# Patient Record
Sex: FEMALE | Race: WHITE | NOT HISPANIC OR LATINO | Employment: OTHER | URBAN - NONMETROPOLITAN AREA
[De-identification: names, ages, dates, MRNs, and addresses within clinical notes are randomized per-mention and may not be internally consistent; named-entity substitution may affect disease eponyms.]

---

## 2017-01-01 ENCOUNTER — OFFICE VISIT (OUTPATIENT)
Dept: FAMILY MEDICINE CLINIC | Facility: CLINIC | Age: 77
End: 2017-01-01

## 2017-01-01 ENCOUNTER — APPOINTMENT (OUTPATIENT)
Dept: LAB | Facility: HOSPITAL | Age: 77
End: 2017-01-01

## 2017-01-01 ENCOUNTER — OFFICE VISIT (OUTPATIENT)
Dept: CARDIOLOGY | Facility: CLINIC | Age: 77
End: 2017-01-01

## 2017-01-01 ENCOUNTER — APPOINTMENT (OUTPATIENT)
Dept: CT IMAGING | Facility: HOSPITAL | Age: 77
End: 2017-01-01

## 2017-01-01 ENCOUNTER — HOSPITAL ENCOUNTER (OUTPATIENT)
Dept: NUCLEAR MEDICINE | Facility: HOSPITAL | Age: 77
Discharge: HOME | End: 2017-04-06
Attending: INTERNAL MEDICINE

## 2017-01-01 ENCOUNTER — APPOINTMENT (OUTPATIENT)
Dept: GENERAL RADIOLOGY | Facility: HOSPITAL | Age: 77
End: 2017-01-01

## 2017-01-01 ENCOUNTER — APPOINTMENT (OUTPATIENT)
Dept: CARDIOLOGY | Facility: HOSPITAL | Age: 77
End: 2017-01-01

## 2017-01-01 ENCOUNTER — OFFICE VISIT (OUTPATIENT)
Dept: PULMONOLOGY | Facility: CLINIC | Age: 77
End: 2017-01-01

## 2017-01-01 ENCOUNTER — OFFICE VISIT (OUTPATIENT)
Dept: ENDOCRINOLOGY | Facility: CLINIC | Age: 77
End: 2017-01-01

## 2017-01-01 ENCOUNTER — APPOINTMENT (OUTPATIENT)
Dept: ULTRASOUND IMAGING | Facility: HOSPITAL | Age: 77
End: 2017-01-01

## 2017-01-01 ENCOUNTER — HOSPITAL ENCOUNTER (OUTPATIENT)
Dept: NUCLEAR MEDICINE | Facility: HOSPITAL | Age: 77
Discharge: HOME | End: 2017-04-14
Attending: INTERNAL MEDICINE

## 2017-01-01 ENCOUNTER — LAB (OUTPATIENT)
Dept: LAB | Facility: HOSPITAL | Age: 77
End: 2017-01-01

## 2017-01-01 ENCOUNTER — HOSPITAL ENCOUNTER (INPATIENT)
Facility: HOSPITAL | Age: 77
LOS: 4 days | End: 2017-04-25
Attending: FAMILY MEDICINE | Admitting: FAMILY MEDICINE

## 2017-01-01 ENCOUNTER — HOSPITAL ENCOUNTER (OUTPATIENT)
Dept: CARDIOLOGY | Facility: HOSPITAL | Age: 77
Discharge: HOME | End: 2017-04-06
Attending: INTERNAL MEDICINE

## 2017-01-01 VITALS
OXYGEN SATURATION: 92 % | HEIGHT: 60 IN | HEART RATE: 84 BPM | SYSTOLIC BLOOD PRESSURE: 98 MMHG | BODY MASS INDEX: 23.98 KG/M2 | WEIGHT: 122.13 LBS | DIASTOLIC BLOOD PRESSURE: 62 MMHG

## 2017-01-01 VITALS
RESPIRATION RATE: 14 BRPM | TEMPERATURE: 96.6 F | DIASTOLIC BLOOD PRESSURE: 55 MMHG | WEIGHT: 117 LBS | SYSTOLIC BLOOD PRESSURE: 79 MMHG | HEIGHT: 60 IN | BODY MASS INDEX: 22.97 KG/M2 | OXYGEN SATURATION: 64 %

## 2017-01-01 VITALS
BODY MASS INDEX: 23.02 KG/M2 | HEART RATE: 60 BPM | WEIGHT: 117.25 LBS | RESPIRATION RATE: 18 BRPM | HEIGHT: 60 IN | DIASTOLIC BLOOD PRESSURE: 40 MMHG | OXYGEN SATURATION: 99 % | SYSTOLIC BLOOD PRESSURE: 78 MMHG

## 2017-01-01 VITALS
SYSTOLIC BLOOD PRESSURE: 100 MMHG | HEART RATE: 69 BPM | HEIGHT: 60 IN | WEIGHT: 122.31 LBS | OXYGEN SATURATION: 95 % | BODY MASS INDEX: 24.01 KG/M2 | DIASTOLIC BLOOD PRESSURE: 64 MMHG

## 2017-01-01 VITALS
HEART RATE: 75 BPM | WEIGHT: 120.13 LBS | DIASTOLIC BLOOD PRESSURE: 62 MMHG | OXYGEN SATURATION: 96 % | HEIGHT: 60 IN | SYSTOLIC BLOOD PRESSURE: 100 MMHG | BODY MASS INDEX: 23.58 KG/M2

## 2017-01-01 VITALS
SYSTOLIC BLOOD PRESSURE: 102 MMHG | HEIGHT: 60 IN | WEIGHT: 124 LBS | BODY MASS INDEX: 24.35 KG/M2 | HEART RATE: 85 BPM | OXYGEN SATURATION: 93 % | DIASTOLIC BLOOD PRESSURE: 63 MMHG

## 2017-01-01 VITALS
TEMPERATURE: 97 F | BODY MASS INDEX: 23.87 KG/M2 | WEIGHT: 121.6 LBS | SYSTOLIC BLOOD PRESSURE: 100 MMHG | DIASTOLIC BLOOD PRESSURE: 60 MMHG | HEIGHT: 60 IN | HEART RATE: 96 BPM | OXYGEN SATURATION: 96 %

## 2017-01-01 VITALS
SYSTOLIC BLOOD PRESSURE: 96 MMHG | DIASTOLIC BLOOD PRESSURE: 58 MMHG | HEIGHT: 60 IN | WEIGHT: 130.9 LBS | BODY MASS INDEX: 25.7 KG/M2 | HEART RATE: 93 BPM

## 2017-01-01 VITALS
WEIGHT: 114.56 LBS | SYSTOLIC BLOOD PRESSURE: 100 MMHG | DIASTOLIC BLOOD PRESSURE: 64 MMHG | OXYGEN SATURATION: 95 % | BODY MASS INDEX: 22.49 KG/M2 | HEART RATE: 75 BPM | HEIGHT: 60 IN

## 2017-01-01 VITALS
WEIGHT: 116.31 LBS | DIASTOLIC BLOOD PRESSURE: 66 MMHG | HEIGHT: 60 IN | OXYGEN SATURATION: 95 % | BODY MASS INDEX: 22.84 KG/M2 | SYSTOLIC BLOOD PRESSURE: 98 MMHG | HEART RATE: 102 BPM

## 2017-01-01 VITALS
OXYGEN SATURATION: 98 % | HEIGHT: 60 IN | BODY MASS INDEX: 21.99 KG/M2 | SYSTOLIC BLOOD PRESSURE: 98 MMHG | WEIGHT: 112 LBS | DIASTOLIC BLOOD PRESSURE: 58 MMHG | HEART RATE: 85 BPM

## 2017-01-01 VITALS
OXYGEN SATURATION: 96 % | DIASTOLIC BLOOD PRESSURE: 64 MMHG | HEIGHT: 60 IN | HEART RATE: 88 BPM | WEIGHT: 128.6 LBS | BODY MASS INDEX: 25.25 KG/M2 | SYSTOLIC BLOOD PRESSURE: 88 MMHG

## 2017-01-01 VITALS
BODY MASS INDEX: 24.65 KG/M2 | OXYGEN SATURATION: 94 % | HEIGHT: 60 IN | HEART RATE: 88 BPM | SYSTOLIC BLOOD PRESSURE: 100 MMHG | WEIGHT: 125.56 LBS | DIASTOLIC BLOOD PRESSURE: 62 MMHG

## 2017-01-01 VITALS
TEMPERATURE: 98 F | HEIGHT: 60 IN | DIASTOLIC BLOOD PRESSURE: 60 MMHG | SYSTOLIC BLOOD PRESSURE: 100 MMHG | HEART RATE: 90 BPM | OXYGEN SATURATION: 94 % | WEIGHT: 126.9 LBS | BODY MASS INDEX: 24.91 KG/M2

## 2017-01-01 VITALS
BODY MASS INDEX: 22.24 KG/M2 | SYSTOLIC BLOOD PRESSURE: 102 MMHG | DIASTOLIC BLOOD PRESSURE: 62 MMHG | HEART RATE: 70 BPM | WEIGHT: 113.9 LBS

## 2017-01-01 VITALS
OXYGEN SATURATION: 92 % | SYSTOLIC BLOOD PRESSURE: 100 MMHG | WEIGHT: 125.8 LBS | BODY MASS INDEX: 24.7 KG/M2 | HEIGHT: 60 IN | HEART RATE: 93 BPM | DIASTOLIC BLOOD PRESSURE: 60 MMHG

## 2017-01-01 DIAGNOSIS — J18.9 PNEUMONIA OF BOTH LUNGS DUE TO INFECTIOUS ORGANISM, UNSPECIFIED PART OF LUNG: Primary | ICD-10-CM

## 2017-01-01 DIAGNOSIS — N17.9 ACUTE RENAL FAILURE, UNSPECIFIED ACUTE RENAL FAILURE TYPE (HCC): ICD-10-CM

## 2017-01-01 DIAGNOSIS — R60.0 LOCALIZED EDEMA: ICD-10-CM

## 2017-01-01 DIAGNOSIS — J90 PLEURAL EFFUSION, BILATERAL: ICD-10-CM

## 2017-01-01 DIAGNOSIS — Z79.899 DRUG THERAPY: ICD-10-CM

## 2017-01-01 DIAGNOSIS — I50.9 HEART FAILURE, UNSPECIFIED HEART FAILURE CHRONICITY, UNSPECIFIED HEART FAILURE TYPE: ICD-10-CM

## 2017-01-01 DIAGNOSIS — R60.9 DEPENDENT EDEMA: ICD-10-CM

## 2017-01-01 DIAGNOSIS — I35.1 NONRHEUMATIC AORTIC VALVE INSUFFICIENCY: ICD-10-CM

## 2017-01-01 DIAGNOSIS — R94.31 ABNORMAL ELECTROCARDIOGRAM: ICD-10-CM

## 2017-01-01 DIAGNOSIS — R53.1 WEAKNESS GENERALIZED: ICD-10-CM

## 2017-01-01 DIAGNOSIS — R00.1 BRADYCARDIA: ICD-10-CM

## 2017-01-01 DIAGNOSIS — M81.0 OSTEOPOROSIS: ICD-10-CM

## 2017-01-01 DIAGNOSIS — I95.0 IDIOPATHIC HYPOTENSION: ICD-10-CM

## 2017-01-01 DIAGNOSIS — I50.21 ACUTE SYSTOLIC HEART FAILURE (HCC): Primary | ICD-10-CM

## 2017-01-01 DIAGNOSIS — I50.9 ACUTE ON CHRONIC CONGESTIVE HEART FAILURE, UNSPECIFIED CONGESTIVE HEART FAILURE TYPE: Primary | ICD-10-CM

## 2017-01-01 DIAGNOSIS — J18.9 PNEUMONIA DUE TO INFECTIOUS ORGANISM, UNSPECIFIED LATERALITY, UNSPECIFIED PART OF LUNG: Primary | ICD-10-CM

## 2017-01-01 DIAGNOSIS — I50.21 ACUTE SYSTOLIC HEART FAILURE (HCC): ICD-10-CM

## 2017-01-01 DIAGNOSIS — R73.03 PREDIABETES: ICD-10-CM

## 2017-01-01 DIAGNOSIS — Z23 NEED FOR VACCINATION: ICD-10-CM

## 2017-01-01 DIAGNOSIS — J90 PLEURAL EFFUSION: ICD-10-CM

## 2017-01-01 DIAGNOSIS — I10 BENIGN HYPERTENSION: ICD-10-CM

## 2017-01-01 DIAGNOSIS — R06.09 DYSPNEA ON EXERTION: Primary | ICD-10-CM

## 2017-01-01 DIAGNOSIS — I27.20 PULMONARY HYPERTENSION (HCC): ICD-10-CM

## 2017-01-01 DIAGNOSIS — I50.22 CHRONIC SYSTOLIC HEART FAILURE (HCC): Primary | ICD-10-CM

## 2017-01-01 DIAGNOSIS — I50.9 HEART FAILURE, UNSPECIFIED HEART FAILURE CHRONICITY, UNSPECIFIED HEART FAILURE TYPE: Primary | ICD-10-CM

## 2017-01-01 DIAGNOSIS — R05.9 COUGH: Primary | ICD-10-CM

## 2017-01-01 DIAGNOSIS — I95.9 HYPOTENSION, UNSPECIFIED HYPOTENSION TYPE: ICD-10-CM

## 2017-01-01 DIAGNOSIS — N17.9 AKI (ACUTE KIDNEY INJURY) (HCC): ICD-10-CM

## 2017-01-01 DIAGNOSIS — I34.0 NON-RHEUMATIC MITRAL REGURGITATION: ICD-10-CM

## 2017-01-01 DIAGNOSIS — R06.09 DYSPNEA ON EXERTION: ICD-10-CM

## 2017-01-01 DIAGNOSIS — E87.70 HYPERVOLEMIA, UNSPECIFIED HYPERVOLEMIA TYPE: ICD-10-CM

## 2017-01-01 DIAGNOSIS — I73.9 CLAUDICATION (HCC): ICD-10-CM

## 2017-01-01 DIAGNOSIS — R11.0 NAUSEA: ICD-10-CM

## 2017-01-01 DIAGNOSIS — I51.7 CARDIOMEGALY: ICD-10-CM

## 2017-01-01 DIAGNOSIS — E89.0 POSTOPERATIVE HYPOTHYROIDISM: ICD-10-CM

## 2017-01-01 DIAGNOSIS — I47.20 VENTRICULAR TACHYCARDIA (HCC): ICD-10-CM

## 2017-01-01 DIAGNOSIS — E05.90 HYPERTHYROIDISM: ICD-10-CM

## 2017-01-01 DIAGNOSIS — J18.9 PLEURAL EFFUSION ASSOCIATED WITH PULMONARY INFECTION: ICD-10-CM

## 2017-01-01 DIAGNOSIS — E89.0 POSTOPERATIVE HYPOTHYROIDISM: Primary | ICD-10-CM

## 2017-01-01 DIAGNOSIS — R11.2 NON-INTRACTABLE VOMITING WITH NAUSEA, UNSPECIFIED VOMITING TYPE: Primary | ICD-10-CM

## 2017-01-01 DIAGNOSIS — I42.8 NONISCHEMIC CARDIOMYOPATHY (HCC): Primary | ICD-10-CM

## 2017-01-01 DIAGNOSIS — J91.8 PLEURAL EFFUSION ASSOCIATED WITH PULMONARY INFECTION: ICD-10-CM

## 2017-01-01 DIAGNOSIS — I47.21 TORSADES DE POINTES (HCC): Primary | ICD-10-CM

## 2017-01-01 LAB
25(OH)D3 SERPL-MCNC: 68.4 NG/ML (ref 30–100)
A PHAGOCYTOPH DNA BLD QL NAA+PROBE: NEGATIVE
ALBUMIN SERPL-MCNC: 2.1 G/DL (ref 3.4–4.8)
ALBUMIN SERPL-MCNC: 2.8 G/DL (ref 3.4–4.8)
ALBUMIN SERPL-MCNC: 2.8 G/DL (ref 3.4–4.8)
ALBUMIN SERPL-MCNC: 3 G/DL (ref 2.9–4.4)
ALBUMIN SERPL-MCNC: 3 G/DL (ref 3.4–4.8)
ALBUMIN SERPL-MCNC: 3.1 G/DL (ref 3.4–4.8)
ALBUMIN SERPL-MCNC: 3.2 G/DL (ref 3.4–4.8)
ALBUMIN SERPL-MCNC: 3.4 G/DL (ref 3.4–4.8)
ALBUMIN SERPL-MCNC: 3.5 G/DL (ref 3.4–4.8)
ALBUMIN UR-MCNC: 56.9 MG/L
ALBUMIN/GLOB SERPL: 0.5 G/DL (ref 1.1–1.8)
ALBUMIN/GLOB SERPL: 0.6 G/DL (ref 1.1–1.8)
ALBUMIN/GLOB SERPL: 0.6 G/DL (ref 1.1–1.8)
ALBUMIN/GLOB SERPL: 0.7 {RATIO} (ref 0.7–1.7)
ALP SERPL-CCNC: 111 U/L (ref 38–126)
ALP SERPL-CCNC: 111 U/L (ref 38–126)
ALP SERPL-CCNC: 122 U/L (ref 38–126)
ALP SERPL-CCNC: 127 U/L (ref 38–126)
ALP SERPL-CCNC: 138 U/L (ref 38–126)
ALP SERPL-CCNC: 139 U/L (ref 38–126)
ALP SERPL-CCNC: 79 U/L (ref 38–126)
ALP SERPL-CCNC: 98 U/L (ref 38–126)
ALPHA1 GLOB FLD ELPH-MCNC: 0.3 G/DL (ref 0–0.4)
ALPHA2 GLOB SERPL ELPH-MCNC: 0.6 G/DL (ref 0.4–1)
ALT SERPL W P-5'-P-CCNC: 145 U/L (ref 9–52)
ALT SERPL W P-5'-P-CCNC: 19 U/L (ref 9–52)
ALT SERPL W P-5'-P-CCNC: 195 U/L (ref 9–52)
ALT SERPL W P-5'-P-CCNC: 235 U/L (ref 9–52)
ALT SERPL W P-5'-P-CCNC: 257 U/L (ref 9–52)
ALT SERPL W P-5'-P-CCNC: 268 U/L (ref 9–52)
ALT SERPL W P-5'-P-CCNC: 31 U/L (ref 9–52)
ALT SERPL W P-5'-P-CCNC: 396 U/L (ref 9–52)
AMYLASE SERPL-CCNC: 108 U/L (ref 50–130)
ANA SER QL: NEGATIVE
ANION GAP SERPL CALCULATED.3IONS-SCNC: 10 MMOL/L (ref 5–15)
ANION GAP SERPL CALCULATED.3IONS-SCNC: 11 MMOL/L (ref 5–15)
ANION GAP SERPL CALCULATED.3IONS-SCNC: 12 MMOL/L (ref 5–15)
ANION GAP SERPL CALCULATED.3IONS-SCNC: 12 MMOL/L (ref 5–15)
ANION GAP SERPL CALCULATED.3IONS-SCNC: 13 MMOL/L (ref 5–15)
ANION GAP SERPL CALCULATED.3IONS-SCNC: 14 MMOL/L (ref 5–15)
ANION GAP SERPL CALCULATED.3IONS-SCNC: 14 MMOL/L (ref 5–15)
ANION GAP SERPL CALCULATED.3IONS-SCNC: 15 MMOL/L (ref 5–15)
ANION GAP SERPL CALCULATED.3IONS-SCNC: 16 MMOL/L (ref 5–15)
ANION GAP SERPL CALCULATED.3IONS-SCNC: 17 MMOL/L (ref 5–15)
ANION GAP SERPL CALCULATED.3IONS-SCNC: 17 MMOL/L (ref 5–15)
ANISOCYTOSIS BLD QL: ABNORMAL
APTT PPP: 33.1 SECONDS (ref 20–40.3)
ARTERIAL PATENCY WRIST A: ABNORMAL
ARTERIAL PATENCY WRIST A: ABNORMAL
AST SERPL-CCNC: 138 U/L (ref 14–36)
AST SERPL-CCNC: 175 U/L (ref 14–36)
AST SERPL-CCNC: 210 U/L (ref 14–36)
AST SERPL-CCNC: 233 U/L (ref 14–36)
AST SERPL-CCNC: 242 U/L (ref 14–36)
AST SERPL-CCNC: 276 U/L (ref 14–36)
AST SERPL-CCNC: 28 U/L (ref 14–36)
AST SERPL-CCNC: 38 U/L (ref 14–36)
ATMOSPHERIC PRESS: ABNORMAL MMHG
ATMOSPHERIC PRESS: ABNORMAL MMHG
B BURGDOR DNA SPEC QL NAA+PROBE: NEGATIVE
B-GLOBULIN SERPL ELPH-MCNC: 0.6 G/DL (ref 0.7–1.3)
BACTERIA SPEC AEROBE CULT: NORMAL
BACTERIA SPEC AEROBE CULT: NORMAL
BACTERIA UR QL AUTO: ABNORMAL /HPF
BASE EXCESS BLDA CALC-SCNC: -3.7 MMOL/L (ref -2.4–2.4)
BASE EXCESS BLDA CALC-SCNC: -8.5 MMOL/L (ref -2.4–2.4)
BASOPHILS # BLD AUTO: 0.01 10*3/MM3 (ref 0–0.2)
BASOPHILS # BLD AUTO: 0.03 10*3/MM3 (ref 0–0.2)
BASOPHILS NFR BLD AUTO: 0.1 % (ref 0–2)
BASOPHILS NFR BLD AUTO: 0.2 % (ref 0–2)
BASOPHILS NFR BLD AUTO: 0.4 % (ref 0–2)
BDY SITE: ABNORMAL
BDY SITE: ABNORMAL
BH CV ECHO MEAS - BSA(HAYCOCK): 1.5 M^2
BH CV ECHO MEAS - BSA: 1.5 M^2
BH CV ECHO MEAS - BZI_BMI: 22.9 KILOGRAMS/M^2
BH CV ECHO MEAS - BZI_METRIC_HEIGHT: 152.4 CM
BH CV ECHO MEAS - BZI_METRIC_WEIGHT: 53.1 KG
BH CV ECHO MEAS - CONTRAST EF (2CH): 62.1 ML/M^2
BH CV ECHO MEAS - CONTRAST EF 4CH: 69.9 ML/M^2
BH CV ECHO MEAS - EDV(CUBED): 41.9 ML
BH CV ECHO MEAS - EDV(MOD-SP2): 79.1 ML
BH CV ECHO MEAS - EDV(MOD-SP4): 115 ML
BH CV ECHO MEAS - EDV(TEICH): 49.9 ML
BH CV ECHO MEAS - EF(CUBED): 61.1 %
BH CV ECHO MEAS - EF(MOD-SP2): 62.1 %
BH CV ECHO MEAS - EF(MOD-SP4): 69.9 %
BH CV ECHO MEAS - EF(TEICH): 53.7 %
BH CV ECHO MEAS - ESV(CUBED): 16.3 ML
BH CV ECHO MEAS - ESV(MOD-SP2): 30 ML
BH CV ECHO MEAS - ESV(MOD-SP4): 34.6 ML
BH CV ECHO MEAS - ESV(TEICH): 23.1 ML
BH CV ECHO MEAS - FS: 27 %
BH CV ECHO MEAS - IVS/LVPW: 1.2
BH CV ECHO MEAS - IVSD: 1.9 CM
BH CV ECHO MEAS - LA DIMENSION: 3.3 CM
BH CV ECHO MEAS - LV DIASTOLIC VOL/BSA (35-75): 77.4 ML/M^2
BH CV ECHO MEAS - LV MASS(C)D: 262 GRAMS
BH CV ECHO MEAS - LV MASS(C)DI: 176.3 GRAMS/M^2
BH CV ECHO MEAS - LV SYSTOLIC VOL/BSA (12-30): 23.3 ML/M^2
BH CV ECHO MEAS - LVIDD: 3.5 CM
BH CV ECHO MEAS - LVIDS: 2.5 CM
BH CV ECHO MEAS - LVLD AP2: 4.9 CM
BH CV ECHO MEAS - LVLD AP4: 7.3 CM
BH CV ECHO MEAS - LVLS AP2: 5 CM
BH CV ECHO MEAS - LVLS AP4: 5 CM
BH CV ECHO MEAS - LVPWD: 1.7 CM
BH CV ECHO MEAS - RVDD: 2.7 CM
BH CV ECHO MEAS - SI(CUBED): 17.2 ML/M^2
BH CV ECHO MEAS - SI(MOD-SP2): 33 ML/M^2
BH CV ECHO MEAS - SI(MOD-SP4): 54.1 ML/M^2
BH CV ECHO MEAS - SI(TEICH): 18 ML/M^2
BH CV ECHO MEAS - SV(CUBED): 25.6 ML
BH CV ECHO MEAS - SV(MOD-SP2): 49.1 ML
BH CV ECHO MEAS - SV(MOD-SP4): 80.4 ML
BH CV ECHO MEAS - SV(TEICH): 26.8 ML
BH CV LOWER ARTERIAL LEFT ABI RATIO: 1.14
BH CV LOWER ARTERIAL LEFT DORSALIS PEDIS SYS MAX: 110 MMHG
BH CV LOWER ARTERIAL LEFT POST TIBIAL SYS MAX: 112 MMHG
BH CV LOWER ARTERIAL RIGHT ABI RATIO: 1.14
BH CV LOWER ARTERIAL RIGHT DORSALIS PEDIS SYS MAX: 105 MMHG
BH CV LOWER ARTERIAL RIGHT POST TIBIAL SYS MAX: 112 MMHG
BH CV STRESS BP STAGE 1: NORMAL
BH CV STRESS COMMENTS STAGE 1: NORMAL
BH CV STRESS DOSE REGADENOSON STAGE 1: 0.4
BH CV STRESS DURATION MIN STAGE 1: 0
BH CV STRESS DURATION SEC STAGE 1: 10
BH CV STRESS HR STAGE 1: 82
BH CV STRESS PROTOCOL 1: NORMAL
BH CV STRESS RECOVERY BP: NORMAL MMHG
BH CV STRESS RECOVERY HR: 90 BPM
BH CV STRESS STAGE 1: 1
BILIRUB SERPL-MCNC: 0.6 MG/DL (ref 0.2–1.3)
BILIRUB SERPL-MCNC: 0.6 MG/DL (ref 0.2–1.3)
BILIRUB SERPL-MCNC: 1 MG/DL (ref 0.2–1.3)
BILIRUB SERPL-MCNC: 1.1 MG/DL (ref 0.2–1.3)
BILIRUB SERPL-MCNC: 1.3 MG/DL (ref 0.2–1.3)
BILIRUB SERPL-MCNC: 1.8 MG/DL (ref 0.2–1.3)
BILIRUB SERPL-MCNC: 2.1 MG/DL (ref 0.2–1.3)
BILIRUB SERPL-MCNC: 2.6 MG/DL (ref 0.2–1.3)
BILIRUB UR QL STRIP: ABNORMAL
BUN BLD-MCNC: 15 MG/DL (ref 7–21)
BUN BLD-MCNC: 17 MG/DL (ref 7–21)
BUN BLD-MCNC: 24 MG/DL (ref 7–21)
BUN BLD-MCNC: 25 MG/DL (ref 7–21)
BUN BLD-MCNC: 26 MG/DL (ref 7–21)
BUN BLD-MCNC: 28 MG/DL (ref 7–21)
BUN BLD-MCNC: 37 MG/DL (ref 7–21)
BUN BLD-MCNC: 44 MG/DL (ref 7–21)
BUN BLD-MCNC: 59 MG/DL (ref 7–21)
BUN BLD-MCNC: 60 MG/DL (ref 7–21)
BUN BLD-MCNC: 62 MG/DL (ref 7–21)
BUN BLD-MCNC: 63 MG/DL (ref 7–21)
BUN BLD-MCNC: 64 MG/DL (ref 7–21)
BUN BLD-MCNC: 65 MG/DL (ref 7–21)
BUN BLD-MCNC: 66 MG/DL (ref 7–21)
BUN/CREAT SERPL: 16.9 (ref 7–25)
BUN/CREAT SERPL: 18.7 (ref 7–25)
BUN/CREAT SERPL: 18.8 (ref 7–25)
BUN/CREAT SERPL: 19.9 (ref 7–25)
BUN/CREAT SERPL: 21.2 (ref 7–25)
BUN/CREAT SERPL: 21.3 (ref 7–25)
BUN/CREAT SERPL: 21.6 (ref 7–25)
BUN/CREAT SERPL: 21.8 (ref 7–25)
BUN/CREAT SERPL: 21.8 (ref 7–25)
BUN/CREAT SERPL: 22.3 (ref 7–25)
BUN/CREAT SERPL: 22.4 (ref 7–25)
BUN/CREAT SERPL: 22.6 (ref 7–25)
BUN/CREAT SERPL: 22.9 (ref 7–25)
BUN/CREAT SERPL: 24.3 (ref 7–25)
BUN/CREAT SERPL: 26.4 (ref 7–25)
C-ANCA TITR SER IF: NORMAL TITER
CA-I BLD-MCNC: 2.9 MG/DL (ref 4.5–4.9)
CA-I BLD-MCNC: 3.6 MG/DL (ref 4.5–4.9)
CA-I BLD-MCNC: 3.7 MG/DL (ref 4.5–4.9)
CA-I BLD-MCNC: 3.9 MG/DL (ref 4.5–4.9)
CA-I BLD-MCNC: 4 MG/DL (ref 4.5–4.9)
CA-I BLD-MCNC: 4.2 MG/DL (ref 4.5–4.9)
CA-I BLD-MCNC: 4.3 MG/DL (ref 4.5–4.9)
CALCIUM SPEC-SCNC: 10.2 MG/DL (ref 8.4–10.2)
CALCIUM SPEC-SCNC: 10.3 MG/DL (ref 8.4–10.2)
CALCIUM SPEC-SCNC: 6.3 MG/DL (ref 8.4–10.2)
CALCIUM SPEC-SCNC: 8.3 MG/DL (ref 8.4–10.2)
CALCIUM SPEC-SCNC: 8.7 MG/DL (ref 8.4–10.2)
CALCIUM SPEC-SCNC: 8.7 MG/DL (ref 8.4–10.2)
CALCIUM SPEC-SCNC: 8.9 MG/DL (ref 8.4–10.2)
CALCIUM SPEC-SCNC: 9 MG/DL (ref 8.4–10.2)
CALCIUM SPEC-SCNC: 9.4 MG/DL (ref 8.4–10.2)
CALCIUM SPEC-SCNC: 9.5 MG/DL (ref 8.4–10.2)
CALCIUM SPEC-SCNC: 9.6 MG/DL (ref 8.4–10.2)
CALCIUM SPEC-SCNC: 9.7 MG/DL (ref 8.4–10.2)
CALCIUM SPEC-SCNC: 9.7 MG/DL (ref 8.4–10.2)
CALCIUM SPEC-SCNC: 9.8 MG/DL (ref 8.4–10.2)
CALCIUM SPEC-SCNC: 9.9 MG/DL (ref 8.4–10.2)
CHLORIDE SERPL-SCNC: 100 MMOL/L (ref 95–110)
CHLORIDE SERPL-SCNC: 107 MMOL/L (ref 95–110)
CHLORIDE SERPL-SCNC: 90 MMOL/L (ref 95–110)
CHLORIDE SERPL-SCNC: 93 MMOL/L (ref 95–110)
CHLORIDE SERPL-SCNC: 96 MMOL/L (ref 95–110)
CHLORIDE SERPL-SCNC: 97 MMOL/L (ref 95–110)
CHLORIDE SERPL-SCNC: 99 MMOL/L (ref 95–110)
CK MB SERPL-CCNC: 2.74 NG/ML (ref 0–5)
CK MB SERPL-CCNC: 2.75 NG/ML (ref 0–5)
CK MB SERPL-CCNC: 3.01 NG/ML (ref 0–5)
CK SERPL-CCNC: 20 U/L (ref 30–135)
CK SERPL-CCNC: 25 U/L (ref 30–135)
CK SERPL-CCNC: 28 U/L (ref 30–135)
CLARITY UR: ABNORMAL
CO2 BLDA-SCNC: 15.3 MMOL/L (ref 23–27)
CO2 BLDA-SCNC: 20.4 MMOL/L (ref 23–27)
CO2 SERPL-SCNC: 14 MMOL/L (ref 22–31)
CO2 SERPL-SCNC: 15 MMOL/L (ref 22–31)
CO2 SERPL-SCNC: 16 MMOL/L (ref 22–31)
CO2 SERPL-SCNC: 17 MMOL/L (ref 22–31)
CO2 SERPL-SCNC: 18 MMOL/L (ref 22–31)
CO2 SERPL-SCNC: 20 MMOL/L (ref 22–31)
CO2 SERPL-SCNC: 25 MMOL/L (ref 22–31)
CO2 SERPL-SCNC: 25 MMOL/L (ref 22–31)
CO2 SERPL-SCNC: 27 MMOL/L (ref 22–31)
CO2 SERPL-SCNC: 27 MMOL/L (ref 22–31)
CO2 SERPL-SCNC: 28 MMOL/L (ref 22–31)
CO2 SERPL-SCNC: 29 MMOL/L (ref 22–31)
CO2 SERPL-SCNC: 29 MMOL/L (ref 22–31)
COLOR UR: ABNORMAL
CORTIS AM PEAK SERPL-MCNC: 36.9 MCG/DL
CREAT BLD-MCNC: 0.89 MG/DL (ref 0.5–1)
CREAT BLD-MCNC: 0.91 MG/DL (ref 0.5–1)
CREAT BLD-MCNC: 1.11 MG/DL (ref 0.5–1)
CREAT BLD-MCNC: 1.12 MG/DL (ref 0.5–1)
CREAT BLD-MCNC: 1.22 MG/DL (ref 0.5–1)
CREAT BLD-MCNC: 1.24 MG/DL (ref 0.5–1)
CREAT BLD-MCNC: 1.4 MG/DL (ref 0.5–1)
CREAT BLD-MCNC: 2.02 MG/DL (ref 0.5–1)
CREAT BLD-MCNC: 2.68 MG/DL (ref 0.5–1)
CREAT BLD-MCNC: 2.75 MG/DL (ref 0.5–1)
CREAT BLD-MCNC: 2.85 MG/DL (ref 0.5–1)
CREAT BLD-MCNC: 2.86 MG/DL (ref 0.5–1)
CREAT BLD-MCNC: 2.96 MG/DL (ref 0.5–1)
CREAT BLD-MCNC: 3.12 MG/DL (ref 0.5–1)
CREAT BLD-MCNC: 3.2 MG/DL (ref 0.5–1)
CRP SERPL-MCNC: 1.5 MG/DL (ref 0–1)
D-DIMER, QUANTITATIVE (MAD,POW, STR): 3199 NG/ML (FEU) (ref 0–470)
D-LACTATE SERPL-SCNC: 1.5 MMOL/L (ref 0.5–2)
D-LACTATE SERPL-SCNC: 2.3 MMOL/L (ref 0.5–2)
D-LACTATE SERPL-SCNC: 5.4 MMOL/L (ref 0.5–2)
DEPRECATED RDW RBC AUTO: 47.3 FL (ref 36.4–46.3)
DEPRECATED RDW RBC AUTO: 49.8 FL (ref 36.4–46.3)
DEPRECATED RDW RBC AUTO: 50 FL (ref 36.4–46.3)
DEPRECATED RDW RBC AUTO: 51.1 FL (ref 36.4–46.3)
DEPRECATED RDW RBC AUTO: 51.4 FL (ref 36.4–46.3)
DEPRECATED RDW RBC AUTO: 52 FL (ref 36.4–46.3)
DEPRECATED RDW RBC AUTO: 53.5 FL (ref 36.4–46.3)
DEPRECATED RDW RBC AUTO: 53.8 FL (ref 36.4–46.3)
DEPRECATED RDW RBC AUTO: 53.9 FL (ref 36.4–46.3)
E CHAFFEENSIS DNA BLD QL NAA+PROBE: NEGATIVE
EOSINOPHIL # BLD AUTO: 0 10*3/MM3 (ref 0–0.7)
EOSINOPHIL # BLD AUTO: 0.01 10*3/MM3 (ref 0–0.7)
EOSINOPHIL # BLD AUTO: 0.03 10*3/MM3 (ref 0–0.7)
EOSINOPHIL NFR BLD AUTO: 0 % (ref 0–7)
EOSINOPHIL NFR BLD AUTO: 0.1 % (ref 0–7)
EOSINOPHIL NFR BLD AUTO: 0.7 % (ref 0–7)
ERYTHROCYTE [DISTWIDTH] IN BLOOD BY AUTOMATED COUNT: 15.5 % (ref 11.5–14.5)
ERYTHROCYTE [DISTWIDTH] IN BLOOD BY AUTOMATED COUNT: 16 % (ref 11.5–14.5)
ERYTHROCYTE [DISTWIDTH] IN BLOOD BY AUTOMATED COUNT: 16 % (ref 11.5–14.5)
ERYTHROCYTE [DISTWIDTH] IN BLOOD BY AUTOMATED COUNT: 16.3 % (ref 11.5–14.5)
ERYTHROCYTE [DISTWIDTH] IN BLOOD BY AUTOMATED COUNT: 16.5 % (ref 11.5–14.5)
ERYTHROCYTE [DISTWIDTH] IN BLOOD BY AUTOMATED COUNT: 16.5 % (ref 11.5–14.5)
ERYTHROCYTE [DISTWIDTH] IN BLOOD BY AUTOMATED COUNT: 17.2 % (ref 11.5–14.5)
ERYTHROCYTE [DISTWIDTH] IN BLOOD BY AUTOMATED COUNT: 17.3 % (ref 11.5–14.5)
ERYTHROCYTE [DISTWIDTH] IN BLOOD BY AUTOMATED COUNT: 17.5 % (ref 11.5–14.5)
GAMMA GLOB SERPL ELPH-MCNC: 3.1 G/DL (ref 0.4–1.8)
GFR SERPL CREATININE-BSD FRML MDRD: 14 ML/MIN/1.73 (ref 60–90)
GFR SERPL CREATININE-BSD FRML MDRD: 15 ML/MIN/1.73
GFR SERPL CREATININE-BSD FRML MDRD: 15 ML/MIN/1.73 (ref 39–90)
GFR SERPL CREATININE-BSD FRML MDRD: 16 ML/MIN/1.73
GFR SERPL CREATININE-BSD FRML MDRD: 16 ML/MIN/1.73
GFR SERPL CREATININE-BSD FRML MDRD: 17 ML/MIN/1.73
GFR SERPL CREATININE-BSD FRML MDRD: 17 ML/MIN/1.73 (ref 39–90)
GFR SERPL CREATININE-BSD FRML MDRD: 24 ML/MIN/1.73 (ref 39–90)
GFR SERPL CREATININE-BSD FRML MDRD: 37 ML/MIN/1.73 (ref 60–90)
GFR SERPL CREATININE-BSD FRML MDRD: 42 ML/MIN/1.73 (ref 60–90)
GFR SERPL CREATININE-BSD FRML MDRD: 43 ML/MIN/1.73 (ref 39–90)
GFR SERPL CREATININE-BSD FRML MDRD: 47 ML/MIN/1.73
GFR SERPL CREATININE-BSD FRML MDRD: 48 ML/MIN/1.73 (ref 60–90)
GFR SERPL CREATININE-BSD FRML MDRD: 60 ML/MIN/1.73 (ref 39–90)
GFR SERPL CREATININE-BSD FRML MDRD: 62 ML/MIN/1.73 (ref 39–90)
GLOBULIN SER CALC-MCNC: 4.6 G/DL (ref 2.2–3.9)
GLOBULIN UR ELPH-MCNC: 4.3 GM/DL (ref 2.3–3.5)
GLOBULIN UR ELPH-MCNC: 5.3 GM/DL (ref 2.3–3.5)
GLOBULIN UR ELPH-MCNC: 5.8 GM/DL (ref 2.3–3.5)
GLOBULIN UR ELPH-MCNC: 5.9 GM/DL (ref 2.3–3.5)
GLOBULIN UR ELPH-MCNC: 6 GM/DL (ref 2.3–3.5)
GLOBULIN UR ELPH-MCNC: 6.2 GM/DL (ref 2.3–3.5)
GLUCOSE BLD-MCNC: 100 MG/DL (ref 60–100)
GLUCOSE BLD-MCNC: 103 MG/DL (ref 60–100)
GLUCOSE BLD-MCNC: 109 MG/DL (ref 60–100)
GLUCOSE BLD-MCNC: 109 MG/DL (ref 60–100)
GLUCOSE BLD-MCNC: 129 MG/DL (ref 60–100)
GLUCOSE BLD-MCNC: 68 MG/DL (ref 60–100)
GLUCOSE BLD-MCNC: 68 MG/DL (ref 60–100)
GLUCOSE BLD-MCNC: 77 MG/DL (ref 60–100)
GLUCOSE BLD-MCNC: 84 MG/DL (ref 60–100)
GLUCOSE BLD-MCNC: 86 MG/DL (ref 60–100)
GLUCOSE BLD-MCNC: 89 MG/DL (ref 60–100)
GLUCOSE BLD-MCNC: 95 MG/DL (ref 60–100)
GLUCOSE BLD-MCNC: 98 MG/DL (ref 60–100)
GLUCOSE BLDA-MCNC: 129 MMOL/L
GLUCOSE BLDA-MCNC: 98 MMOL/L
GLUCOSE UR STRIP-MCNC: NEGATIVE MG/DL
GRAN CASTS URNS QL MICRO: ABNORMAL /LPF
HAV IGM SERPL QL IA: NEGATIVE
HBV CORE IGM SERPL QL IA: NEGATIVE
HBV SURFACE AG SERPL QL IA: NEGATIVE
HCO3 BLDA-SCNC: 14.5 MMOL/L (ref 22–26)
HCO3 BLDA-SCNC: 19.5 MMOL/L (ref 22–26)
HCT VFR BLD AUTO: 30.6 % (ref 35–45)
HCT VFR BLD AUTO: 31.2 % (ref 35–45)
HCT VFR BLD AUTO: 31.3 % (ref 35–45)
HCT VFR BLD AUTO: 33.1 % (ref 35–45)
HCT VFR BLD AUTO: 33.7 % (ref 35–45)
HCT VFR BLD AUTO: 34.4 % (ref 35–45)
HCT VFR BLD AUTO: 34.8 % (ref 35–45)
HCT VFR BLD AUTO: 35.5 % (ref 35–45)
HCT VFR BLD AUTO: 37.3 % (ref 35–45)
HCT VFR BLD CALC: 37 % (ref 38–47)
HCT VFR BLD CALC: 40 % (ref 38–47)
HCV AB SER DONR QL: NEGATIVE
HGB BLD-MCNC: 11 G/DL (ref 12–15.5)
HGB BLD-MCNC: 11.5 G/DL (ref 12–15.5)
HGB BLD-MCNC: 11.6 G/DL (ref 12–15.5)
HGB BLD-MCNC: 12.1 G/DL (ref 12–15.5)
HGB BLD-MCNC: 12.2 G/DL (ref 12–15.5)
HGB BLD-MCNC: 12.3 G/DL (ref 12–15.5)
HGB BLD-MCNC: 12.5 G/DL (ref 12–15.5)
HGB BLD-MCNC: 12.8 G/DL (ref 12–15.5)
HGB BLD-MCNC: 13.3 G/DL (ref 12–15.5)
HGB BLDA-MCNC: 12.6 G/DL (ref 12–16)
HGB BLDA-MCNC: 13.7 G/DL (ref 12–16)
HGB UR QL STRIP.AUTO: ABNORMAL
HOLD SPECIMEN: NORMAL
HYALINE CASTS UR QL AUTO: ABNORMAL /LPF
IMM GRANULOCYTES # BLD: 0.01 10*3/MM3 (ref 0–0.02)
IMM GRANULOCYTES # BLD: 0.02 10*3/MM3 (ref 0–0.02)
IMM GRANULOCYTES # BLD: 0.03 10*3/MM3 (ref 0–0.02)
IMM GRANULOCYTES # BLD: 0.04 10*3/MM3 (ref 0–0.02)
IMM GRANULOCYTES NFR BLD: 0.2 % (ref 0–0.5)
IMM GRANULOCYTES NFR BLD: 0.3 % (ref 0–0.5)
IMM GRANULOCYTES NFR BLD: 0.4 % (ref 0–0.5)
IMM GRANULOCYTES NFR BLD: 0.5 % (ref 0–0.5)
INR PPP: 1.17 (ref 0.8–1.2)
INR PPP: 1.48 (ref 0.8–1.2)
INR PPP: 1.74 (ref 0.8–1.2)
IRON 24H UR-MRATE: 20 MCG/DL (ref 37–170)
IRON SATN MFR SERPL: 8 % (ref 15–50)
KETONES UR QL STRIP: NEGATIVE
L PNEUMO1 AG UR QL IA: NEGATIVE
LARGE PLATELETS: ABNORMAL
LEUKOCYTE ESTERASE UR QL STRIP.AUTO: ABNORMAL
LIPASE SERPL-CCNC: 117 U/L (ref 23–300)
LV EF 2D ECHO EST: 50 %
LV EF NUC BP: 55 %
LYMPHOCYTES # BLD AUTO: 1.03 10*3/MM3 (ref 0.6–4.2)
LYMPHOCYTES # BLD AUTO: 1.14 10*3/MM3 (ref 0.6–4.2)
LYMPHOCYTES # BLD AUTO: 1.18 10*3/MM3 (ref 0.6–4.2)
LYMPHOCYTES # BLD AUTO: 1.36 10*3/MM3 (ref 0.6–4.2)
LYMPHOCYTES # BLD AUTO: 1.55 10*3/MM3 (ref 0.6–4.2)
LYMPHOCYTES # BLD AUTO: 1.62 10*3/MM3 (ref 0.6–4.2)
LYMPHOCYTES # BLD MANUAL: 1.06 10*3/MM3 (ref 0.6–4.2)
LYMPHOCYTES NFR BLD AUTO: 13 % (ref 10–50)
LYMPHOCYTES NFR BLD AUTO: 13.1 % (ref 10–50)
LYMPHOCYTES NFR BLD AUTO: 17.4 % (ref 10–50)
LYMPHOCYTES NFR BLD AUTO: 17.6 % (ref 10–50)
LYMPHOCYTES NFR BLD AUTO: 20.6 % (ref 10–50)
LYMPHOCYTES NFR BLD AUTO: 35.6 % (ref 10–50)
LYMPHOCYTES NFR BLD MANUAL: 14 % (ref 10–50)
LYMPHOCYTES NFR BLD MANUAL: 17 % (ref 0–12)
Lab: ABNORMAL
M-SPIKE: 3 G/DL
MACROCYTES BLD QL SMEAR: ABNORMAL
MAGNESIUM SERPL-MCNC: 1.5 MG/DL (ref 1.6–2.3)
MAGNESIUM SERPL-MCNC: 1.6 MG/DL (ref 1.6–2.3)
MAGNESIUM SERPL-MCNC: 1.8 MG/DL (ref 1.6–2.3)
MAGNESIUM SERPL-MCNC: 2.1 MG/DL (ref 1.6–2.3)
MAGNESIUM SERPL-MCNC: 3.1 MG/DL (ref 1.6–2.3)
MAGNESIUM SERPL-MCNC: 3.2 MG/DL (ref 1.6–2.3)
MAGNESIUM SERPL-MCNC: 3.6 MG/DL (ref 1.6–2.3)
MAGNESIUM SERPL-MCNC: 3.9 MG/DL (ref 1.6–2.3)
MAGNESIUM SERPL-MCNC: 3.9 MG/DL (ref 1.6–2.3)
MAXIMAL PREDICTED HEART RATE: 144 BPM
MAXIMAL PREDICTED HEART RATE: 144 BPM
MCH RBC QN AUTO: 30.6 PG (ref 26.5–34)
MCH RBC QN AUTO: 31.1 PG (ref 26.5–34)
MCH RBC QN AUTO: 31.3 PG (ref 26.5–34)
MCH RBC QN AUTO: 31.4 PG (ref 26.5–34)
MCH RBC QN AUTO: 31.5 PG (ref 26.5–34)
MCH RBC QN AUTO: 31.6 PG (ref 26.5–34)
MCH RBC QN AUTO: 31.8 PG (ref 26.5–34)
MCH RBC QN AUTO: 32.2 PG (ref 26.5–34)
MCH RBC QN AUTO: 32.3 PG (ref 26.5–34)
MCHC RBC AUTO-ENTMCNC: 34.6 G/DL (ref 31.4–36)
MCHC RBC AUTO-ENTMCNC: 35.7 G/DL (ref 31.4–36)
MCHC RBC AUTO-ENTMCNC: 35.9 G/DL (ref 31.4–36)
MCHC RBC AUTO-ENTMCNC: 35.9 G/DL (ref 31.4–36)
MCHC RBC AUTO-ENTMCNC: 36.2 G/DL (ref 31.4–36)
MCHC RBC AUTO-ENTMCNC: 36.6 G/DL (ref 31.4–36)
MCHC RBC AUTO-ENTMCNC: 36.9 G/DL (ref 31.4–36)
MCHC RBC AUTO-ENTMCNC: 37.1 G/DL (ref 31.4–36)
MCHC RBC AUTO-ENTMCNC: 37.2 G/DL (ref 31.4–36)
MCV RBC AUTO: 85.7 FL (ref 80–98)
MCV RBC AUTO: 85.8 FL (ref 80–98)
MCV RBC AUTO: 86.6 FL (ref 80–98)
MCV RBC AUTO: 87.1 FL (ref 80–98)
MCV RBC AUTO: 87.1 FL (ref 80–98)
MCV RBC AUTO: 87.2 FL (ref 80–98)
MCV RBC AUTO: 87.4 FL (ref 80–98)
MCV RBC AUTO: 88.3 FL (ref 80–98)
MCV RBC AUTO: 88.3 FL (ref 80–98)
MODALITY: ABNORMAL
MODALITY: ABNORMAL
MONOCYTES # BLD AUTO: 0.83 10*3/MM3 (ref 0–0.9)
MONOCYTES # BLD AUTO: 1.17 10*3/MM3 (ref 0–0.9)
MONOCYTES # BLD AUTO: 1.29 10*3/MM3 (ref 0–0.9)
MONOCYTES # BLD AUTO: 1.3 10*3/MM3 (ref 0–0.9)
MONOCYTES # BLD AUTO: 1.42 10*3/MM3 (ref 0–0.9)
MONOCYTES # BLD AUTO: 1.56 10*3/MM3 (ref 0–0.9)
MONOCYTES # BLD AUTO: 1.68 10*3/MM3 (ref 0–0.9)
MONOCYTES NFR BLD AUTO: 17.3 % (ref 0–12)
MONOCYTES NFR BLD AUTO: 17.3 % (ref 0–12)
MONOCYTES NFR BLD AUTO: 17.9 % (ref 0–12)
MONOCYTES NFR BLD AUTO: 18 % (ref 0–12)
MONOCYTES NFR BLD AUTO: 18.2 % (ref 0–12)
MONOCYTES NFR BLD AUTO: 21.7 % (ref 0–12)
NEUTROPHILS # BLD AUTO: 2.05 10*3/MM3 (ref 2–8.6)
NEUTROPHILS # BLD AUTO: 4.39 10*3/MM3 (ref 2–8.6)
NEUTROPHILS # BLD AUTO: 4.64 10*3/MM3 (ref 2–8.6)
NEUTROPHILS # BLD AUTO: 4.66 10*3/MM3 (ref 2–8.6)
NEUTROPHILS # BLD AUTO: 5.15 10*3/MM3 (ref 2–8.6)
NEUTROPHILS # BLD AUTO: 5.44 10*3/MM3 (ref 2–8.6)
NEUTROPHILS # BLD AUTO: 5.94 10*3/MM3 (ref 2–8.6)
NEUTROPHILS NFR BLD AUTO: 45.1 % (ref 37–80)
NEUTROPHILS NFR BLD AUTO: 60.2 % (ref 37–80)
NEUTROPHILS NFR BLD AUTO: 61.7 % (ref 37–80)
NEUTROPHILS NFR BLD AUTO: 64.9 % (ref 37–80)
NEUTROPHILS NFR BLD AUTO: 68.3 % (ref 37–80)
NEUTROPHILS NFR BLD AUTO: 68.3 % (ref 37–80)
NEUTROPHILS NFR BLD MANUAL: 68 % (ref 37–80)
NITRITE UR QL STRIP: NEGATIVE
NRBC BLD MANUAL-RTO: 0.5 /100 WBC (ref 0–0)
NRBC BLD MANUAL-RTO: 3.3 /100 WBC (ref 0–0)
NT-PROBNP SERPL-MCNC: ABNORMAL PG/ML (ref 0–1800)
NT-PROBNP SERPL-MCNC: ABNORMAL PG/ML (ref 0–1800)
OSMOLALITY SERPL: 292 MOSM/KG
OSMOLALITY UR: 569 MOSM/KG
P-ANCA ATYPICAL TITR SER IF: NORMAL TITER
P-ANCA TITR SER IF: NORMAL TITER
PCO2 BLDA: 24.5 MM HG (ref 35–45)
PCO2 BLDA: 29.8 MM HG (ref 35–45)
PERCENT MAX PREDICTED HR: 65.28 %
PH BLDA: 7.39 PH UNITS (ref 7.35–7.45)
PH BLDA: 7.43 PH UNITS (ref 7.35–7.45)
PH UR STRIP.AUTO: <=5 [PH] (ref 5–9)
PHOSPHATE SERPL-MCNC: 5 MG/DL (ref 2.4–4.4)
PHOSPHATE SERPL-MCNC: 6.5 MG/DL (ref 2.4–4.4)
PHOSPHATE SERPL-MCNC: 6.6 MG/DL (ref 2.4–4.4)
PHOSPHATE SERPL-MCNC: 7.9 MG/DL (ref 2.4–4.4)
PHOSPHATE SERPL-MCNC: 8 MG/DL (ref 2.4–4.4)
PLATELET # BLD AUTO: 183 10*3/MM3 (ref 150–450)
PLATELET # BLD AUTO: 204 10*3/MM3 (ref 150–450)
PLATELET # BLD AUTO: 210 10*3/MM3 (ref 150–450)
PLATELET # BLD AUTO: 231 10*3/MM3 (ref 150–450)
PLATELET # BLD AUTO: 233 10*3/MM3 (ref 150–450)
PLATELET # BLD AUTO: 236 10*3/MM3 (ref 150–450)
PLATELET # BLD AUTO: 291 10*3/MM3 (ref 150–450)
PLATELET # BLD AUTO: 299 10*3/MM3 (ref 150–450)
PLATELET # BLD AUTO: 320 10*3/MM3 (ref 150–450)
PMV BLD AUTO: 10.1 FL (ref 8–12)
PMV BLD AUTO: 10.3 FL (ref 8–12)
PMV BLD AUTO: 10.4 FL (ref 8–12)
PMV BLD AUTO: 10.4 FL (ref 8–12)
PMV BLD AUTO: 11 FL (ref 8–12)
PMV BLD AUTO: 9.1 FL (ref 8–12)
PMV BLD AUTO: 9.7 FL (ref 8–12)
PO2 BLDA: 82.2 MM HG (ref 80–105)
PO2 BLDA: 85.7 MM HG (ref 80–105)
POLYCHROMASIA BLD QL SMEAR: ABNORMAL
POTASSIUM BLD-SCNC: 3.3 MMOL/L (ref 3.5–5.1)
POTASSIUM BLD-SCNC: 3.5 MMOL/L (ref 3.5–5.1)
POTASSIUM BLD-SCNC: 3.5 MMOL/L (ref 3.5–5.1)
POTASSIUM BLD-SCNC: 3.6 MMOL/L (ref 3.5–5.1)
POTASSIUM BLD-SCNC: 3.6 MMOL/L (ref 3.5–5.1)
POTASSIUM BLD-SCNC: 3.8 MMOL/L (ref 3.5–5.1)
POTASSIUM BLD-SCNC: 3.8 MMOL/L (ref 3.5–5.1)
POTASSIUM BLD-SCNC: 3.9 MMOL/L (ref 3.5–5.1)
POTASSIUM BLD-SCNC: 4.1 MMOL/L (ref 3.5–5.1)
POTASSIUM BLD-SCNC: 4.3 MMOL/L (ref 3.5–5.1)
POTASSIUM BLD-SCNC: 5.2 MMOL/L (ref 3.5–5.1)
POTASSIUM BLD-SCNC: 5.2 MMOL/L (ref 3.5–5.1)
POTASSIUM BLD-SCNC: 5.4 MMOL/L (ref 3.5–5.1)
POTASSIUM BLDA-SCNC: 5.01 MMOL/L (ref 3.6–4.9)
POTASSIUM BLDA-SCNC: 5.11 MMOL/L (ref 3.6–4.9)
PROT SERPL-MCNC: 6.4 G/DL (ref 6.3–8.6)
PROT SERPL-MCNC: 7.6 G/DL (ref 6–8.5)
PROT SERPL-MCNC: 8.1 G/DL (ref 6.3–8.6)
PROT SERPL-MCNC: 8.1 G/DL (ref 6.3–8.6)
PROT SERPL-MCNC: 8.3 G/DL (ref 6.3–8.6)
PROT SERPL-MCNC: 8.9 G/DL (ref 6.3–8.6)
PROT SERPL-MCNC: 9.2 G/DL (ref 6.3–8.6)
PROT SERPL-MCNC: 9.4 G/DL (ref 6.3–8.6)
PROT SERPL-MCNC: 9.6 G/DL (ref 6.3–8.6)
PROT UR QL STRIP: ABNORMAL
PROT UR-MCNC: 71.7 MG/DL
PROTHROMBIN TIME: 14.9 SECONDS (ref 11.1–15.3)
PROTHROMBIN TIME: 17.9 SECONDS (ref 11.1–15.3)
PROTHROMBIN TIME: 20.4 SECONDS (ref 11.1–15.3)
R RICKETTSI IGM TITR SER: 0.13 INDEX (ref 0–0.89)
RBC # BLD AUTO: 3.51 10*6/MM3 (ref 3.77–5.16)
RBC # BLD AUTO: 3.64 10*6/MM3 (ref 3.77–5.16)
RBC # BLD AUTO: 3.65 10*6/MM3 (ref 3.77–5.16)
RBC # BLD AUTO: 3.75 10*6/MM3 (ref 3.77–5.16)
RBC # BLD AUTO: 3.87 10*6/MM3 (ref 3.77–5.16)
RBC # BLD AUTO: 3.97 10*6/MM3 (ref 3.77–5.16)
RBC # BLD AUTO: 3.98 10*6/MM3 (ref 3.77–5.16)
RBC # BLD AUTO: 4.02 10*6/MM3 (ref 3.77–5.16)
RBC # BLD AUTO: 4.28 10*6/MM3 (ref 3.77–5.16)
RBC # UR: ABNORMAL /HPF
REF LAB TEST METHOD: ABNORMAL
RICK SF IGG TITR SER IF: NEGATIVE {TITER}
S PNEUM AG SPEC QL LA: NEGATIVE
SAO2 % BLDCOA: 94.6 %
SAO2 % BLDCOA: 95.8 %
SMALL PLATELETS BLD QL SMEAR: ADEQUATE
SMALL PLATELETS BLD QL SMEAR: ADEQUATE
SODIUM BLD-SCNC: 125 MMOL/L (ref 137–145)
SODIUM BLD-SCNC: 125 MMOL/L (ref 137–145)
SODIUM BLD-SCNC: 126 MMOL/L (ref 137–145)
SODIUM BLD-SCNC: 127 MMOL/L (ref 137–145)
SODIUM BLD-SCNC: 132 MMOL/L (ref 137–145)
SODIUM BLD-SCNC: 133 MMOL/L (ref 137–145)
SODIUM BLD-SCNC: 134 MMOL/L (ref 137–145)
SODIUM BLD-SCNC: 136 MMOL/L (ref 137–145)
SODIUM BLD-SCNC: 137 MMOL/L (ref 137–145)
SODIUM BLDA-SCNC: 124.9 MMOL/L (ref 138–146)
SODIUM BLDA-SCNC: 125.7 MMOL/L (ref 138–146)
SODIUM UR-SCNC: 8 MMOL/L (ref 30–90)
SP GR UR STRIP: 1.02 (ref 1–1.03)
SQUAMOUS #/AREA URNS HPF: ABNORMAL /HPF
STRESS BASELINE BP: NORMAL MMHG
STRESS BASELINE HR: 84 BPM
STRESS PERCENT HR: 77 %
STRESS POST ESTIMATED WORKLOAD: 1 METS
STRESS POST PEAK BP: NORMAL MMHG
STRESS POST PEAK HR: 94 BPM
STRESS TARGET HR: 122 BPM
STRESS TARGET HR: 122 BPM
T3FREE SERPL-MCNC: 1.2 PG/ML (ref 2–4.4)
T4 FREE SERPL-MCNC: 2.15 NG/DL (ref 0.78–2.19)
T4 FREE SERPL-MCNC: 3.27 NG/DL (ref 0.78–2.19)
TARGETS BLD QL SMEAR: NORMAL
TIBC SERPL-MCNC: 253 MCG/DL (ref 265–497)
TROPONIN I SERPL-MCNC: 0.17 NG/ML
TROPONIN I SERPL-MCNC: 0.19 NG/ML
TROPONIN I SERPL-MCNC: 0.19 NG/ML
TSH SERPL DL<=0.05 MIU/L-ACNC: 3.98 MIU/ML (ref 0.46–4.68)
TSH SERPL DL<=0.05 MIU/L-ACNC: 6.37 MIU/ML (ref 0.46–4.68)
TSH SERPL DL<=0.05 MIU/L-ACNC: 8.74 MIU/ML (ref 0.46–4.68)
UNIDENT CRYS URNS QL MICRO: ABNORMAL /HPF
UPPER ARTERIAL LEFT ARM BRACHIAL SYS MAX: 95 MMHG
UPPER ARTERIAL RIGHT ARM BRACHIAL SYS MAX: 98 MMHG
URATE CRY URNS QL MICRO: ABNORMAL /HPF
UROBILINOGEN UR QL STRIP: ABNORMAL
VARIANT LYMPHS NFR BLD MANUAL: 1 % (ref 0–5)
WBC MORPH BLD: NORMAL
WBC MORPH BLD: NORMAL
WBC NRBC COR # BLD: 4.55 10*3/MM3 (ref 3.2–9.8)
WBC NRBC COR # BLD: 5.64 10*3/MM3 (ref 3.2–9.8)
WBC NRBC COR # BLD: 6.77 10*3/MM3 (ref 3.2–9.8)
WBC NRBC COR # BLD: 7.52 10*3/MM3 (ref 3.2–9.8)
WBC NRBC COR # BLD: 7.57 10*3/MM3 (ref 3.2–9.8)
WBC NRBC COR # BLD: 7.74 10*3/MM3 (ref 3.2–9.8)
WBC NRBC COR # BLD: 7.95 10*3/MM3 (ref 3.2–9.8)
WBC NRBC COR # BLD: 8.69 10*3/MM3 (ref 3.2–9.8)
WBC NRBC COR # BLD: 9.97 10*3/MM3 (ref 3.2–9.8)
WBC UR QL AUTO: ABNORMAL /HPF
WHOLE BLOOD HOLD SPECIMEN: NORMAL
WHOLE BLOOD HOLD SPECIMEN: NORMAL

## 2017-01-01 PROCEDURE — 96372 THER/PROPH/DIAG INJ SC/IM: CPT | Performed by: NURSE PRACTITIONER

## 2017-01-01 PROCEDURE — 80048 BASIC METABOLIC PNL TOTAL CA: CPT | Performed by: NURSE PRACTITIONER

## 2017-01-01 PROCEDURE — 99233 SBSQ HOSP IP/OBS HIGH 50: CPT | Performed by: STUDENT IN AN ORGANIZED HEALTH CARE EDUCATION/TRAINING PROGRAM

## 2017-01-01 PROCEDURE — 99214 OFFICE O/P EST MOD 30 MIN: CPT | Performed by: INTERNAL MEDICINE

## 2017-01-01 PROCEDURE — 84100 ASSAY OF PHOSPHORUS: CPT | Performed by: FAMILY MEDICINE

## 2017-01-01 PROCEDURE — 33210 INSERT ELECTRD/PM CATH SNGL: CPT | Performed by: INTERNAL MEDICINE

## 2017-01-01 PROCEDURE — 84166 PROTEIN E-PHORESIS/URINE/CSF: CPT | Performed by: INTERNAL MEDICINE

## 2017-01-01 PROCEDURE — 83935 ASSAY OF URINE OSMOLALITY: CPT | Performed by: INTERNAL MEDICINE

## 2017-01-01 PROCEDURE — 99232 SBSQ HOSP IP/OBS MODERATE 35: CPT | Performed by: NURSE PRACTITIONER

## 2017-01-01 PROCEDURE — 99215 OFFICE O/P EST HI 40 MIN: CPT | Performed by: NURSE PRACTITIONER

## 2017-01-01 PROCEDURE — 36415 COLL VENOUS BLD VENIPUNCTURE: CPT | Performed by: NURSE PRACTITIONER

## 2017-01-01 PROCEDURE — 99214 OFFICE O/P EST MOD 30 MIN: CPT | Performed by: NURSE PRACTITIONER

## 2017-01-01 PROCEDURE — 84439 ASSAY OF FREE THYROXINE: CPT | Performed by: FAMILY MEDICINE

## 2017-01-01 PROCEDURE — 93017 CV STRESS TEST TRACING ONLY: CPT

## 2017-01-01 PROCEDURE — 78452 HT MUSCLE IMAGE SPECT MULT: CPT | Performed by: INTERNAL MEDICINE

## 2017-01-01 PROCEDURE — 85610 PROTHROMBIN TIME: CPT | Performed by: FAMILY MEDICINE

## 2017-01-01 PROCEDURE — A9560 TC99M LABELED RBC: HCPCS | Performed by: INTERNAL MEDICINE

## 2017-01-01 PROCEDURE — 36415 COLL VENOUS BLD VENIPUNCTURE: CPT | Performed by: FAMILY MEDICINE

## 2017-01-01 PROCEDURE — 25010000002 FUROSEMIDE PER 20 MG: Performed by: INTERNAL MEDICINE

## 2017-01-01 PROCEDURE — 85025 COMPLETE CBC W/AUTO DIFF WBC: CPT | Performed by: FAMILY MEDICINE

## 2017-01-01 PROCEDURE — 80053 COMPREHEN METABOLIC PANEL: CPT | Performed by: NURSE PRACTITIONER

## 2017-01-01 PROCEDURE — 25010000002 PIPERACILLIN SOD-TAZOBACTAM PER 1 G: Performed by: FAMILY MEDICINE

## 2017-01-01 PROCEDURE — 25010000002 MORPHINE SULFATE (PF) 2 MG/ML SOLUTION: Performed by: FAMILY MEDICINE

## 2017-01-01 PROCEDURE — 25010000002 CALCIUM GLUCONATE PER 10 ML: Performed by: INTERNAL MEDICINE

## 2017-01-01 PROCEDURE — 84300 ASSAY OF URINE SODIUM: CPT | Performed by: INTERNAL MEDICINE

## 2017-01-01 PROCEDURE — 99214 OFFICE O/P EST MOD 30 MIN: CPT | Performed by: FAMILY MEDICINE

## 2017-01-01 PROCEDURE — 93000 ELECTROCARDIOGRAM COMPLETE: CPT | Performed by: INTERNAL MEDICINE

## 2017-01-01 PROCEDURE — 82553 CREATINE MB FRACTION: CPT | Performed by: FAMILY MEDICINE

## 2017-01-01 PROCEDURE — 80053 COMPREHEN METABOLIC PANEL: CPT | Performed by: FAMILY MEDICINE

## 2017-01-01 PROCEDURE — 99291 CRITICAL CARE FIRST HOUR: CPT | Performed by: INTERNAL MEDICINE

## 2017-01-01 PROCEDURE — 25010000002 AMIODARONE IN DEXTROSE 5% 360-4.14 MG/200ML-% SOLUTION: Performed by: INTERNAL MEDICINE

## 2017-01-01 PROCEDURE — 84484 ASSAY OF TROPONIN QUANT: CPT | Performed by: FAMILY MEDICINE

## 2017-01-01 PROCEDURE — 80074 ACUTE HEPATITIS PANEL: CPT | Performed by: FAMILY MEDICINE

## 2017-01-01 PROCEDURE — 84156 ASSAY OF PROTEIN URINE: CPT | Performed by: INTERNAL MEDICINE

## 2017-01-01 PROCEDURE — 84165 PROTEIN E-PHORESIS SERUM: CPT | Performed by: INTERNAL MEDICINE

## 2017-01-01 PROCEDURE — A9500 TC99M SESTAMIBI: HCPCS | Performed by: INTERNAL MEDICINE

## 2017-01-01 PROCEDURE — 87798 DETECT AGENT NOS DNA AMP: CPT | Performed by: FAMILY MEDICINE

## 2017-01-01 PROCEDURE — 83540 ASSAY OF IRON: CPT | Performed by: INTERNAL MEDICINE

## 2017-01-01 PROCEDURE — 93010 ELECTROCARDIOGRAM REPORT: CPT | Performed by: INTERNAL MEDICINE

## 2017-01-01 PROCEDURE — 0 TECHNETIUM SESTAMIBI: Performed by: INTERNAL MEDICINE

## 2017-01-01 PROCEDURE — 25010000002 MAGNESIUM SULFATE 2 GM/50ML SOLUTION: Performed by: FAMILY MEDICINE

## 2017-01-01 PROCEDURE — 83605 ASSAY OF LACTIC ACID: CPT | Performed by: FAMILY MEDICINE

## 2017-01-01 PROCEDURE — 25010000002 AMIODARONE IN DEXTROSE 5% 150-4.21 MG/100ML-% SOLUTION

## 2017-01-01 PROCEDURE — 76700 US EXAM ABDOM COMPLETE: CPT

## 2017-01-01 PROCEDURE — 25010000002 MAGNESIUM SULFATE 2 GM/50ML SOLUTION: Performed by: INTERNAL MEDICINE

## 2017-01-01 PROCEDURE — 71010 HC CHEST PA OR AP: CPT

## 2017-01-01 PROCEDURE — 99232 SBSQ HOSP IP/OBS MODERATE 35: CPT | Performed by: INTERNAL MEDICINE

## 2017-01-01 PROCEDURE — 80048 BASIC METABOLIC PNL TOTAL CA: CPT

## 2017-01-01 PROCEDURE — 82533 TOTAL CORTISOL: CPT | Performed by: FAMILY MEDICINE

## 2017-01-01 PROCEDURE — 99233 SBSQ HOSP IP/OBS HIGH 50: CPT | Performed by: INTERNAL MEDICINE

## 2017-01-01 PROCEDURE — 84156 ASSAY OF PROTEIN URINE: CPT | Performed by: FAMILY MEDICINE

## 2017-01-01 PROCEDURE — 25010000002 AMIODARONE PER 30 MG: Performed by: FAMILY MEDICINE

## 2017-01-01 PROCEDURE — 83930 ASSAY OF BLOOD OSMOLALITY: CPT | Performed by: INTERNAL MEDICINE

## 2017-01-01 PROCEDURE — 99203 OFFICE O/P NEW LOW 30 MIN: CPT | Performed by: INTERNAL MEDICINE

## 2017-01-01 PROCEDURE — 82550 ASSAY OF CK (CPK): CPT | Performed by: FAMILY MEDICINE

## 2017-01-01 PROCEDURE — 99284 EMERGENCY DEPT VISIT MOD MDM: CPT

## 2017-01-01 PROCEDURE — 93005 ELECTROCARDIOGRAM TRACING: CPT | Performed by: NURSE PRACTITIONER

## 2017-01-01 PROCEDURE — 85025 COMPLETE CBC W/AUTO DIFF WBC: CPT | Performed by: INTERNAL MEDICINE

## 2017-01-01 PROCEDURE — 0 TECHNETIUM LABELED RED BLOOD CELLS: Performed by: INTERNAL MEDICINE

## 2017-01-01 PROCEDURE — 86256 FLUORESCENT ANTIBODY TITER: CPT | Performed by: FAMILY MEDICINE

## 2017-01-01 PROCEDURE — 84155 ASSAY OF PROTEIN SERUM: CPT | Performed by: INTERNAL MEDICINE

## 2017-01-01 PROCEDURE — 78452 HT MUSCLE IMAGE SPECT MULT: CPT

## 2017-01-01 PROCEDURE — 85027 COMPLETE CBC AUTOMATED: CPT | Performed by: FAMILY MEDICINE

## 2017-01-01 PROCEDURE — 25010000002 METOCLOPRAMIDE PER 10 MG: Performed by: FAMILY MEDICINE

## 2017-01-01 PROCEDURE — 83550 IRON BINDING TEST: CPT | Performed by: INTERNAL MEDICINE

## 2017-01-01 PROCEDURE — 25010000002 HEPARIN (PORCINE) PER 1000 UNITS: Performed by: FAMILY MEDICINE

## 2017-01-01 PROCEDURE — 80053 COMPREHEN METABOLIC PANEL: CPT | Performed by: INTERNAL MEDICINE

## 2017-01-01 PROCEDURE — 25010000002 MAGNESIUM SULFATE IN D5W 1G/100ML (PREMIX) 1-5 GM/100ML-% SOLUTION: Performed by: FAMILY MEDICINE

## 2017-01-01 PROCEDURE — 82330 ASSAY OF CALCIUM: CPT | Performed by: NURSE PRACTITIONER

## 2017-01-01 PROCEDURE — 84443 ASSAY THYROID STIM HORMONE: CPT | Performed by: NURSE PRACTITIONER

## 2017-01-01 PROCEDURE — 82043 UR ALBUMIN QUANTITATIVE: CPT | Performed by: INTERNAL MEDICINE

## 2017-01-01 PROCEDURE — 36415 COLL VENOUS BLD VENIPUNCTURE: CPT

## 2017-01-01 PROCEDURE — 83880 ASSAY OF NATRIURETIC PEPTIDE: CPT | Performed by: NURSE PRACTITIONER

## 2017-01-01 PROCEDURE — C1894 INTRO/SHEATH, NON-LASER: HCPCS | Performed by: INTERNAL MEDICINE

## 2017-01-01 PROCEDURE — 93005 ELECTROCARDIOGRAM TRACING: CPT | Performed by: FAMILY MEDICINE

## 2017-01-01 PROCEDURE — 82803 BLOOD GASES ANY COMBINATION: CPT | Performed by: FAMILY MEDICINE

## 2017-01-01 PROCEDURE — 36415 COLL VENOUS BLD VENIPUNCTURE: CPT | Performed by: INTERNAL MEDICINE

## 2017-01-01 PROCEDURE — 36600 WITHDRAWAL OF ARTERIAL BLOOD: CPT

## 2017-01-01 PROCEDURE — 83735 ASSAY OF MAGNESIUM: CPT | Performed by: FAMILY MEDICINE

## 2017-01-01 PROCEDURE — 94640 AIRWAY INHALATION TREATMENT: CPT

## 2017-01-01 PROCEDURE — 94760 N-INVAS EAR/PLS OXIMETRY 1: CPT

## 2017-01-01 PROCEDURE — 90670 PCV13 VACCINE IM: CPT | Performed by: FAMILY MEDICINE

## 2017-01-01 PROCEDURE — 93005 ELECTROCARDIOGRAM TRACING: CPT | Performed by: INTERNAL MEDICINE

## 2017-01-01 PROCEDURE — 94660 CPAP INITIATION&MGMT: CPT

## 2017-01-01 PROCEDURE — P9046 ALBUMIN (HUMAN), 25%, 20 ML: HCPCS | Performed by: STUDENT IN AN ORGANIZED HEALTH CARE EDUCATION/TRAINING PROGRAM

## 2017-01-01 PROCEDURE — 87476 LYME DIS DNA AMP PROBE: CPT | Performed by: FAMILY MEDICINE

## 2017-01-01 PROCEDURE — 93308 TTE F-UP OR LMTD: CPT | Performed by: INTERNAL MEDICINE

## 2017-01-01 PROCEDURE — 83735 ASSAY OF MAGNESIUM: CPT

## 2017-01-01 PROCEDURE — 25010000002 CALCIUM GLUCONATE PER 10 ML: Performed by: FAMILY MEDICINE

## 2017-01-01 PROCEDURE — 82330 ASSAY OF CALCIUM: CPT | Performed by: FAMILY MEDICINE

## 2017-01-01 PROCEDURE — 85379 FIBRIN DEGRADATION QUANT: CPT | Performed by: FAMILY MEDICINE

## 2017-01-01 PROCEDURE — 86038 ANTINUCLEAR ANTIBODIES: CPT | Performed by: FAMILY MEDICINE

## 2017-01-01 PROCEDURE — 84443 ASSAY THYROID STIM HORMONE: CPT | Performed by: FAMILY MEDICINE

## 2017-01-01 PROCEDURE — 85007 BL SMEAR W/DIFF WBC COUNT: CPT | Performed by: FAMILY MEDICINE

## 2017-01-01 PROCEDURE — 85025 COMPLETE CBC W/AUTO DIFF WBC: CPT | Performed by: NURSE PRACTITIONER

## 2017-01-01 PROCEDURE — G0009 ADMIN PNEUMOCOCCAL VACCINE: HCPCS | Performed by: FAMILY MEDICINE

## 2017-01-01 PROCEDURE — 87040 BLOOD CULTURE FOR BACTERIA: CPT | Performed by: FAMILY MEDICINE

## 2017-01-01 PROCEDURE — 25010000002 REGADENOSON 0.4 MG/5ML SOLUTION: Performed by: INTERNAL MEDICINE

## 2017-01-01 PROCEDURE — 25010000002 ALBUMIN HUMAN 25% PER 50 ML: Performed by: STUDENT IN AN ORGANIZED HEALTH CARE EDUCATION/TRAINING PROGRAM

## 2017-01-01 PROCEDURE — 5A1223Z PERFORMANCE OF CARDIAC PACING, CONTINUOUS: ICD-10-PCS | Performed by: INTERNAL MEDICINE

## 2017-01-01 PROCEDURE — 78472 GATED HEART PLANAR SINGLE: CPT

## 2017-01-01 PROCEDURE — 87086 URINE CULTURE/COLONY COUNT: CPT | Performed by: FAMILY MEDICINE

## 2017-01-01 PROCEDURE — 25010000002 FUROSEMIDE PER 20 MG: Performed by: STUDENT IN AN ORGANIZED HEALTH CARE EDUCATION/TRAINING PROGRAM

## 2017-01-01 PROCEDURE — 85730 THROMBOPLASTIN TIME PARTIAL: CPT | Performed by: FAMILY MEDICINE

## 2017-01-01 PROCEDURE — 74176 CT ABD & PELVIS W/O CONTRAST: CPT

## 2017-01-01 PROCEDURE — 93018 CV STRESS TEST I&R ONLY: CPT | Performed by: INTERNAL MEDICINE

## 2017-01-01 PROCEDURE — 86140 C-REACTIVE PROTEIN: CPT | Performed by: FAMILY MEDICINE

## 2017-01-01 PROCEDURE — 94799 UNLISTED PULMONARY SVC/PX: CPT

## 2017-01-01 PROCEDURE — 93016 CV STRESS TEST SUPVJ ONLY: CPT | Performed by: INTERNAL MEDICINE

## 2017-01-01 PROCEDURE — 81001 URINALYSIS AUTO W/SCOPE: CPT | Performed by: FAMILY MEDICINE

## 2017-01-01 PROCEDURE — 84443 ASSAY THYROID STIM HORMONE: CPT | Performed by: INTERNAL MEDICINE

## 2017-01-01 PROCEDURE — 25010000002 LIDOCAINE PER 10 MG: Performed by: FAMILY MEDICINE

## 2017-01-01 PROCEDURE — 99223 1ST HOSP IP/OBS HIGH 75: CPT | Performed by: STUDENT IN AN ORGANIZED HEALTH CARE EDUCATION/TRAINING PROGRAM

## 2017-01-01 PROCEDURE — 93308 TTE F-UP OR LMTD: CPT

## 2017-01-01 PROCEDURE — 87899 AGENT NOS ASSAY W/OPTIC: CPT | Performed by: FAMILY MEDICINE

## 2017-01-01 PROCEDURE — 87449 NOS EACH ORGANISM AG IA: CPT | Performed by: FAMILY MEDICINE

## 2017-01-01 PROCEDURE — 84481 FREE ASSAY (FT-3): CPT | Performed by: FAMILY MEDICINE

## 2017-01-01 PROCEDURE — 25010000002 ONDANSETRON PER 1 MG: Performed by: NURSE PRACTITIONER

## 2017-01-01 PROCEDURE — 94010 BREATHING CAPACITY TEST: CPT | Performed by: INTERNAL MEDICINE

## 2017-01-01 PROCEDURE — 82306 VITAMIN D 25 HYDROXY: CPT | Performed by: NURSE PRACTITIONER

## 2017-01-01 PROCEDURE — 25010000002 CALCIUM GLUCONATE PER 10 ML: Performed by: NURSE PRACTITIONER

## 2017-01-01 PROCEDURE — 25010000002 PROMETHAZINE PER 50 MG: Performed by: STUDENT IN AN ORGANIZED HEALTH CARE EDUCATION/TRAINING PROGRAM

## 2017-01-01 RX ORDER — MORPHINE SULFATE 2 MG/ML
1 INJECTION, SOLUTION INTRAMUSCULAR; INTRAVENOUS EVERY 4 HOURS PRN
Status: DISCONTINUED | OUTPATIENT
Start: 2017-01-01 | End: 2017-01-01

## 2017-01-01 RX ORDER — 0.9 % SODIUM CHLORIDE 0.9 %
10 VIAL (ML) INJECTION AS NEEDED
Status: DISCONTINUED | OUTPATIENT
Start: 2017-01-01 | End: 2017-01-01 | Stop reason: HOSPADM

## 2017-01-01 RX ORDER — FUROSEMIDE 10 MG/ML
40 INJECTION INTRAMUSCULAR; INTRAVENOUS ONCE
Status: COMPLETED | OUTPATIENT
Start: 2017-01-01 | End: 2017-01-01

## 2017-01-01 RX ORDER — CALCIUM GLUCONATE 94 MG/ML
1 INJECTION, SOLUTION INTRAVENOUS ONCE
Status: COMPLETED | OUTPATIENT
Start: 2017-01-01 | End: 2017-01-01

## 2017-01-01 RX ORDER — METOCLOPRAMIDE HYDROCHLORIDE 5 MG/ML
5 INJECTION INTRAMUSCULAR; INTRAVENOUS EVERY 4 HOURS PRN
Status: DISCONTINUED | OUTPATIENT
Start: 2017-01-01 | End: 2017-01-01 | Stop reason: HOSPADM

## 2017-01-01 RX ORDER — NALOXONE HCL 0.4 MG/ML
0.4 VIAL (ML) INJECTION
Status: DISCONTINUED | OUTPATIENT
Start: 2017-01-01 | End: 2017-01-01 | Stop reason: SDUPTHER

## 2017-01-01 RX ORDER — CEFUROXIME AXETIL 250 MG/1
250 TABLET ORAL 2 TIMES DAILY
Qty: 20 TABLET | Refills: 0 | Status: SHIPPED | OUTPATIENT
Start: 2017-01-01 | End: 2017-01-01 | Stop reason: DRUGHIGH

## 2017-01-01 RX ORDER — MAGNESIUM SULFATE HEPTAHYDRATE 40 MG/ML
2 INJECTION, SOLUTION INTRAVENOUS ONCE
Status: COMPLETED | OUTPATIENT
Start: 2017-01-01 | End: 2017-01-01

## 2017-01-01 RX ORDER — LEVOFLOXACIN 500 MG/1
500 TABLET, FILM COATED ORAL DAILY
Qty: 10 TABLET | Refills: 0 | Status: SHIPPED | OUTPATIENT
Start: 2017-01-01 | End: 2017-01-01

## 2017-01-01 RX ORDER — ALBUTEROL SULFATE 2.5 MG/3ML
1.25 SOLUTION RESPIRATORY (INHALATION) EVERY 4 HOURS PRN
Status: DISCONTINUED | OUTPATIENT
Start: 2017-01-01 | End: 2017-01-01 | Stop reason: HOSPADM

## 2017-01-01 RX ORDER — AMIODARONE HYDROCHLORIDE 50 MG/ML
150 INJECTION, SOLUTION INTRAVENOUS ONCE
Status: COMPLETED | OUTPATIENT
Start: 2017-01-01 | End: 2017-01-01

## 2017-01-01 RX ORDER — SODIUM CHLORIDE 0.9 % (FLUSH) 0.9 %
10 SYRINGE (ML) INJECTION AS NEEDED
Status: DISCONTINUED | OUTPATIENT
Start: 2017-01-01 | End: 2017-01-01 | Stop reason: HOSPADM

## 2017-01-01 RX ORDER — ONDANSETRON 2 MG/ML
4 INJECTION INTRAMUSCULAR; INTRAVENOUS EVERY 6 HOURS PRN
Status: DISCONTINUED | OUTPATIENT
Start: 2017-01-01 | End: 2017-01-01 | Stop reason: HOSPADM

## 2017-01-01 RX ORDER — FUROSEMIDE 10 MG/ML
20 INJECTION INTRAMUSCULAR; INTRAVENOUS ONCE
Status: COMPLETED | OUTPATIENT
Start: 2017-01-01 | End: 2017-01-01

## 2017-01-01 RX ORDER — FUROSEMIDE 20 MG/1
20 TABLET ORAL DAILY
Qty: 30 TABLET | Refills: 1 | Status: SHIPPED | OUTPATIENT
Start: 2017-01-01 | End: 2017-01-01

## 2017-01-01 RX ORDER — ALBUTEROL SULFATE 90 UG/1
2 AEROSOL, METERED RESPIRATORY (INHALATION) EVERY 4 HOURS PRN
Qty: 8 G | Refills: 1 | Status: SHIPPED | OUTPATIENT
Start: 2017-01-01

## 2017-01-01 RX ORDER — MAGNESIUM SULFATE 1 G/100ML
4 INJECTION INTRAVENOUS ONCE
Status: COMPLETED | OUTPATIENT
Start: 2017-01-01 | End: 2017-01-01

## 2017-01-01 RX ORDER — POTASSIUM CHLORIDE 750 MG/1
10 TABLET, FILM COATED, EXTENDED RELEASE ORAL 2 TIMES DAILY
COMMUNITY
Start: 2017-01-01 | End: 2017-01-01

## 2017-01-01 RX ORDER — PROMETHAZINE HYDROCHLORIDE 25 MG/ML
12.5 INJECTION, SOLUTION INTRAMUSCULAR; INTRAVENOUS EVERY 6 HOURS PRN
Status: DISCONTINUED | OUTPATIENT
Start: 2017-01-01 | End: 2017-01-01

## 2017-01-01 RX ORDER — SODIUM CHLORIDE 9 MG/ML
50 INJECTION, SOLUTION INTRAVENOUS CONTINUOUS
Status: DISCONTINUED | OUTPATIENT
Start: 2017-01-01 | End: 2017-01-01

## 2017-01-01 RX ORDER — ALBUMIN (HUMAN) 12.5 G/50ML
25 SOLUTION INTRAVENOUS ONCE
Status: COMPLETED | OUTPATIENT
Start: 2017-01-01 | End: 2017-01-01

## 2017-01-01 RX ORDER — SODIUM CHLORIDE 0.9 % (FLUSH) 0.9 %
1-10 SYRINGE (ML) INJECTION AS NEEDED
Status: DISCONTINUED | OUTPATIENT
Start: 2017-01-01 | End: 2017-01-01 | Stop reason: HOSPADM

## 2017-01-01 RX ORDER — SPIRONOLACTONE 25 MG/1
TABLET ORAL
Qty: 15 TABLET | Refills: 2 | Status: SHIPPED | OUTPATIENT
Start: 2017-01-01

## 2017-01-01 RX ORDER — SODIUM CHLORIDE 9 MG/ML
INJECTION, SOLUTION INTRAVENOUS
Status: COMPLETED
Start: 2017-01-01 | End: 2017-01-01

## 2017-01-01 RX ORDER — LIDOCAINE HYDROCHLORIDE 20 MG/ML
INJECTION, SOLUTION INFILTRATION; PERINEURAL AS NEEDED
Status: DISCONTINUED | OUTPATIENT
Start: 2017-01-01 | End: 2017-01-01 | Stop reason: HOSPADM

## 2017-01-01 RX ORDER — LIDOCAINE HYDROCHLORIDE ANHYDROUS AND DEXTROSE MONOHYDRATE 5; 400 G/100ML; MG/100ML
1 INJECTION, SOLUTION INTRAVENOUS CONTINUOUS
Status: DISCONTINUED | OUTPATIENT
Start: 2017-01-01 | End: 2017-01-01

## 2017-01-01 RX ORDER — MORPHINE SULFATE 2 MG/ML
2 INJECTION, SOLUTION INTRAMUSCULAR; INTRAVENOUS
Status: DISCONTINUED | OUTPATIENT
Start: 2017-01-01 | End: 2017-01-01 | Stop reason: HOSPADM

## 2017-01-01 RX ORDER — BUMETANIDE 2 MG/1
1 TABLET ORAL DAILY
Qty: 31 TABLET | Refills: 0 | Status: SHIPPED | OUTPATIENT
Start: 2017-01-01

## 2017-01-01 RX ORDER — PAROXETINE HYDROCHLORIDE 20 MG/1
20 TABLET, FILM COATED ORAL DAILY
Qty: 30 TABLET | Refills: 2 | Status: SHIPPED | OUTPATIENT
Start: 2017-01-01 | End: 2017-01-01 | Stop reason: HOSPADM

## 2017-01-01 RX ORDER — NALOXONE HCL 0.4 MG/ML
0.4 VIAL (ML) INJECTION
Status: DISCONTINUED | OUTPATIENT
Start: 2017-01-01 | End: 2017-01-01 | Stop reason: HOSPADM

## 2017-01-01 RX ORDER — LEVOTHYROXINE SODIUM 0.12 MG/1
125 TABLET ORAL DAILY
Qty: 30 TABLET | Refills: 11 | Status: SHIPPED | OUTPATIENT
Start: 2017-01-01 | End: 2018-04-12

## 2017-01-01 RX ORDER — AMIODARONE HYDROCHLORIDE 200 MG/1
200 TABLET ORAL EVERY 12 HOURS SCHEDULED
Status: DISCONTINUED | OUTPATIENT
Start: 2017-01-01 | End: 2017-01-01 | Stop reason: HOSPADM

## 2017-01-01 RX ORDER — POTASSIUM CHLORIDE 20 MEQ/1
TABLET, EXTENDED RELEASE ORAL
Qty: 30 TABLET | Refills: 0 | Status: SHIPPED | OUTPATIENT
Start: 2017-01-01 | End: 2017-01-01 | Stop reason: ALTCHOICE

## 2017-01-01 RX ORDER — LEVOTHYROXINE SODIUM 112 UG/1
112 TABLET ORAL DAILY
Qty: 30 TABLET | Refills: 3 | Status: SHIPPED | OUTPATIENT
Start: 2017-01-01 | End: 2017-01-01

## 2017-01-01 RX ORDER — ALBUTEROL SULFATE 1.25 MG/3ML
1 SOLUTION RESPIRATORY (INHALATION) EVERY 6 HOURS PRN
Qty: 60 VIAL | Refills: 12 | Status: SHIPPED | OUTPATIENT
Start: 2017-01-01

## 2017-01-01 RX ORDER — HEPARIN SODIUM 5000 [USP'U]/ML
5000 INJECTION, SOLUTION INTRAVENOUS; SUBCUTANEOUS EVERY 12 HOURS SCHEDULED
Status: DISCONTINUED | OUTPATIENT
Start: 2017-01-01 | End: 2017-01-01 | Stop reason: HOSPADM

## 2017-01-01 RX ORDER — LEVOTHYROXINE SODIUM 0.12 MG/1
125 TABLET ORAL
Status: DISCONTINUED | OUTPATIENT
Start: 2017-01-01 | End: 2017-01-01 | Stop reason: HOSPADM

## 2017-01-01 RX ORDER — ADHESIVE TAPE 3"X 2.3 YD
200 TAPE, NON-MEDICATED TOPICAL 2 TIMES DAILY
Qty: 60 TABLET | Refills: 0 | Status: SHIPPED | OUTPATIENT
Start: 2017-01-01

## 2017-01-01 RX ADMIN — AMIODARONE HYDROCHLORIDE 1 MG/MIN: 1.8 INJECTION, SOLUTION INTRAVENOUS at 15:16

## 2017-01-01 RX ADMIN — AMIODARONE HYDROCHLORIDE 0.5 MG/MIN: 1.8 INJECTION, SOLUTION INTRAVENOUS at 09:08

## 2017-01-01 RX ADMIN — TAZOBACTAM SODIUM AND PIPERACILLIN SODIUM 2.25 G: 250; 2 INJECTION, SOLUTION INTRAVENOUS at 07:11

## 2017-01-01 RX ADMIN — FUROSEMIDE 40 MG: 10 INJECTION INTRAMUSCULAR; INTRAVENOUS at 10:35

## 2017-01-01 RX ADMIN — SODIUM CHLORIDE 75 ML/HR: 900 INJECTION, SOLUTION INTRAVENOUS at 23:27

## 2017-01-01 RX ADMIN — FUROSEMIDE 20 MG: 10 INJECTION INTRAMUSCULAR; INTRAVENOUS at 12:15

## 2017-01-01 RX ADMIN — PHENYLEPHRINE HYDROCHLORIDE 2.3 MCG/KG/MIN: 10 INJECTION INTRAVENOUS at 13:38

## 2017-01-01 RX ADMIN — TAZOBACTAM SODIUM AND PIPERACILLIN SODIUM 2.25 G: 250; 2 INJECTION, SOLUTION INTRAVENOUS at 09:07

## 2017-01-01 RX ADMIN — MAGNESIUM SULFATE HEPTAHYDRATE 2 G: 40 INJECTION, SOLUTION INTRAVENOUS at 15:24

## 2017-01-01 RX ADMIN — PROMETHAZINE HYDROCHLORIDE 12.5 MG: 25 INJECTION, SOLUTION INTRAMUSCULAR; INTRAVENOUS at 22:03

## 2017-01-01 RX ADMIN — AMIODARONE HYDROCHLORIDE 150 MG: 1.5 INJECTION, SOLUTION INTRAVENOUS at 15:03

## 2017-01-01 RX ADMIN — TAZOBACTAM SODIUM AND PIPERACILLIN SODIUM 2.25 G: 250; 2 INJECTION, SOLUTION INTRAVENOUS at 14:34

## 2017-01-01 RX ADMIN — DOXYCYCLINE 100 MG: 100 INJECTION, POWDER, LYOPHILIZED, FOR SOLUTION INTRAVENOUS at 08:52

## 2017-01-01 RX ADMIN — Medication 10 ML: at 09:27

## 2017-01-01 RX ADMIN — PHENYLEPHRINE HYDROCHLORIDE 3 MCG/KG/MIN: 10 INJECTION INTRAVENOUS at 01:43

## 2017-01-01 RX ADMIN — DOXYCYCLINE 100 MG: 100 INJECTION, POWDER, LYOPHILIZED, FOR SOLUTION INTRAVENOUS at 06:40

## 2017-01-01 RX ADMIN — Medication 10 ML: at 15:52

## 2017-01-01 RX ADMIN — DOXYCYCLINE 100 MG: 100 INJECTION, POWDER, LYOPHILIZED, FOR SOLUTION INTRAVENOUS at 09:03

## 2017-01-01 RX ADMIN — HEPARIN SODIUM 5000 UNITS: 5000 INJECTION, SOLUTION INTRAVENOUS; SUBCUTANEOUS at 21:29

## 2017-01-01 RX ADMIN — TAZOBACTAM SODIUM AND PIPERACILLIN SODIUM 2.25 G: 250; 2 INJECTION, SOLUTION INTRAVENOUS at 21:30

## 2017-01-01 RX ADMIN — MORPHINE SULFATE 2 MG: 2 INJECTION, SOLUTION INTRAMUSCULAR; INTRAVENOUS at 15:27

## 2017-01-01 RX ADMIN — REGADENOSON 0.4 MG: 0.08 INJECTION, SOLUTION INTRAVENOUS at 09:01

## 2017-01-01 RX ADMIN — HEPARIN SODIUM 5000 UNITS: 5000 INJECTION, SOLUTION INTRAVENOUS; SUBCUTANEOUS at 20:32

## 2017-01-01 RX ADMIN — FUROSEMIDE 40 MG: 10 INJECTION, SOLUTION INTRAMUSCULAR; INTRAVENOUS at 08:11

## 2017-01-01 RX ADMIN — TAZOBACTAM SODIUM AND PIPERACILLIN SODIUM 2.25 G: 250; 2 INJECTION, SOLUTION INTRAVENOUS at 00:46

## 2017-01-01 RX ADMIN — MAGNESIUM SULFATE HEPTAHYDRATE 4 G: 1 INJECTION, SOLUTION INTRAVENOUS at 12:56

## 2017-01-01 RX ADMIN — Medication 1 DOSE: at 09:01

## 2017-01-01 RX ADMIN — MORPHINE SULFATE 1 MG: 2 INJECTION, SOLUTION INTRAMUSCULAR; INTRAVENOUS at 03:01

## 2017-01-01 RX ADMIN — ALBUMIN (HUMAN) 25 G: 0.25 INJECTION, SOLUTION INTRAVENOUS at 16:01

## 2017-01-01 RX ADMIN — HEPARIN SODIUM 5000 UNITS: 5000 INJECTION, SOLUTION INTRAVENOUS; SUBCUTANEOUS at 09:08

## 2017-01-01 RX ADMIN — HEPARIN SODIUM 5000 UNITS: 5000 INJECTION, SOLUTION INTRAVENOUS; SUBCUTANEOUS at 20:38

## 2017-01-01 RX ADMIN — TAZOBACTAM SODIUM AND PIPERACILLIN SODIUM 2.25 G: 250; 2 INJECTION, SOLUTION INTRAVENOUS at 01:47

## 2017-01-01 RX ADMIN — FUROSEMIDE 40 MG: 10 INJECTION INTRAMUSCULAR; INTRAVENOUS at 12:20

## 2017-01-01 RX ADMIN — METOCLOPRAMIDE 5 MG: 5 INJECTION, SOLUTION INTRAMUSCULAR; INTRAVENOUS at 15:23

## 2017-01-01 RX ADMIN — SODIUM CHLORIDE 10 ML: 9 INJECTION INTRAMUSCULAR; INTRAVENOUS; SUBCUTANEOUS at 09:01

## 2017-01-01 RX ADMIN — Medication 10 ML: at 15:04

## 2017-01-01 RX ADMIN — SODIUM CHLORIDE 500 ML: 9 INJECTION, SOLUTION INTRAVENOUS at 22:12

## 2017-01-01 RX ADMIN — CALCIUM GLUCONATE 1 G: 94 INJECTION, SOLUTION INTRAVENOUS at 10:26

## 2017-01-01 RX ADMIN — PHENYLEPHRINE HYDROCHLORIDE 3 MCG/KG/MIN: 10 INJECTION INTRAVENOUS at 21:22

## 2017-01-01 RX ADMIN — TAZOBACTAM SODIUM AND PIPERACILLIN SODIUM 2.25 G: 250; 2 INJECTION, SOLUTION INTRAVENOUS at 19:30

## 2017-01-01 RX ADMIN — FUROSEMIDE 40 MG: 10 INJECTION INTRAMUSCULAR; INTRAVENOUS at 12:25

## 2017-01-01 RX ADMIN — TAZOBACTAM SODIUM AND PIPERACILLIN SODIUM 2.25 G: 250; 2 INJECTION, SOLUTION INTRAVENOUS at 14:53

## 2017-01-01 RX ADMIN — HEPARIN SODIUM 5000 UNITS: 5000 INJECTION, SOLUTION INTRAVENOUS; SUBCUTANEOUS at 21:27

## 2017-01-01 RX ADMIN — PHENYLEPHRINE HYDROCHLORIDE 0.5 MCG/KG/MIN: 10 INJECTION INTRAVENOUS at 07:10

## 2017-01-01 RX ADMIN — AMIODARONE HYDROCHLORIDE 1 MG/MIN: 1.8 INJECTION, SOLUTION INTRAVENOUS at 18:01

## 2017-01-01 RX ADMIN — SODIUM CHLORIDE 500 ML: 900 INJECTION, SOLUTION INTRAVENOUS at 23:14

## 2017-01-01 RX ADMIN — FUROSEMIDE 40 MG: 10 INJECTION INTRAMUSCULAR; INTRAVENOUS at 15:05

## 2017-01-01 RX ADMIN — DOXYCYCLINE 100 MG: 100 INJECTION, POWDER, LYOPHILIZED, FOR SOLUTION INTRAVENOUS at 21:27

## 2017-01-01 RX ADMIN — TAZOBACTAM SODIUM AND PIPERACILLIN SODIUM 2.25 G: 250; 2 INJECTION, SOLUTION INTRAVENOUS at 02:06

## 2017-01-01 RX ADMIN — HEPARIN SODIUM 5000 UNITS: 5000 INJECTION, SOLUTION INTRAVENOUS; SUBCUTANEOUS at 09:39

## 2017-01-01 RX ADMIN — TAZOBACTAM SODIUM AND PIPERACILLIN SODIUM 2.25 G: 250; 2 INJECTION, SOLUTION INTRAVENOUS at 00:43

## 2017-01-01 RX ADMIN — FUROSEMIDE 20 MG: 10 INJECTION, SOLUTION INTRAVENOUS at 16:41

## 2017-01-01 RX ADMIN — SODIUM CHLORIDE 50 ML/HR: 900 INJECTION, SOLUTION INTRAVENOUS at 20:50

## 2017-01-01 RX ADMIN — Medication 10 ML: at 15:06

## 2017-01-01 RX ADMIN — AMIODARONE HYDROCHLORIDE 200 MG: 200 TABLET ORAL at 20:38

## 2017-01-01 RX ADMIN — LIDOCAINE HYDROCHLORIDE 100 MG: 20 INJECTION INTRAVENOUS at 12:58

## 2017-01-01 RX ADMIN — FUROSEMIDE 40 MG: 10 INJECTION INTRAMUSCULAR; INTRAVENOUS at 09:45

## 2017-01-01 RX ADMIN — TAZOBACTAM SODIUM AND PIPERACILLIN SODIUM 2.25 G: 250; 2 INJECTION, SOLUTION INTRAVENOUS at 21:27

## 2017-01-01 RX ADMIN — LEVOTHYROXINE SODIUM 125 MCG: 125 TABLET ORAL at 06:03

## 2017-01-01 RX ADMIN — NOREPINEPHRINE BITARTRATE 0.02 MCG/KG/MIN: 1 INJECTION INTRAVENOUS at 01:29

## 2017-01-01 RX ADMIN — Medication 1 DOSE: at 12:20

## 2017-01-01 RX ADMIN — DOXYCYCLINE 100 MG: 100 INJECTION, POWDER, LYOPHILIZED, FOR SOLUTION INTRAVENOUS at 20:38

## 2017-01-01 RX ADMIN — FUROSEMIDE 5 MG/HR: 10 INJECTION, SOLUTION INTRAVENOUS at 09:27

## 2017-01-01 RX ADMIN — ONDANSETRON 4 MG: 2 INJECTION INTRAMUSCULAR; INTRAVENOUS at 14:23

## 2017-01-01 RX ADMIN — DOXYCYCLINE 100 MG: 100 INJECTION, POWDER, LYOPHILIZED, FOR SOLUTION INTRAVENOUS at 20:31

## 2017-01-01 RX ADMIN — CALCIUM GLUCONATE 1 G: 94 INJECTION, SOLUTION INTRAVENOUS at 13:13

## 2017-01-01 RX ADMIN — ALBUTEROL SULFATE 1.25 MG: 2.5 SOLUTION RESPIRATORY (INHALATION) at 18:47

## 2017-01-01 RX ADMIN — MAGNESIUM SULFATE HEPTAHYDRATE 2 G: 40 INJECTION, SOLUTION INTRAVENOUS at 12:45

## 2017-01-01 RX ADMIN — TAZOBACTAM SODIUM AND PIPERACILLIN SODIUM 2.25 G: 250; 2 INJECTION, SOLUTION INTRAVENOUS at 08:14

## 2017-01-01 RX ADMIN — DOXYCYCLINE 100 MG: 100 INJECTION, POWDER, LYOPHILIZED, FOR SOLUTION INTRAVENOUS at 21:29

## 2017-01-01 RX ADMIN — SODIUM CHLORIDE 75 ML/HR: 900 INJECTION, SOLUTION INTRAVENOUS at 05:38

## 2017-01-01 RX ADMIN — LIDOCAINE HYDROCHLORIDE 1 MG/MIN: 4 INJECTION, SOLUTION INTRAVENOUS at 13:02

## 2017-01-01 RX ADMIN — TAZOBACTAM SODIUM AND PIPERACILLIN SODIUM 2.25 G: 250; 2 INJECTION, SOLUTION INTRAVENOUS at 13:38

## 2017-01-01 RX ADMIN — Medication 10 ML: at 22:13

## 2017-01-01 RX ADMIN — TAZOBACTAM SODIUM AND PIPERACILLIN SODIUM 2.25 G: 250; 2 INJECTION, SOLUTION INTRAVENOUS at 20:32

## 2017-01-01 RX ADMIN — Medication 1 DOSE: at 07:54

## 2017-01-01 RX ADMIN — SODIUM CHLORIDE 75 ML/HR: 900 INJECTION, SOLUTION INTRAVENOUS at 20:01

## 2017-01-01 RX ADMIN — AMIODARONE HYDROCHLORIDE 1 MG/MIN: 1.8 INJECTION, SOLUTION INTRAVENOUS at 20:44

## 2017-01-01 RX ADMIN — AMIODARONE HYDROCHLORIDE 150 MG: 50 INJECTION, SOLUTION INTRAVENOUS at 12:49

## 2017-01-01 RX ADMIN — LEVOTHYROXINE SODIUM 125 MCG: 125 TABLET ORAL at 05:38

## 2017-01-01 RX ADMIN — FUROSEMIDE 20 MG: 10 INJECTION INTRAMUSCULAR; INTRAVENOUS at 15:00

## 2017-01-01 RX ADMIN — CALCIUM GLUCONATE 1 G: 94 INJECTION, SOLUTION INTRAVENOUS at 15:51

## 2017-01-01 RX ADMIN — SODIUM CHLORIDE 500 ML: 9 INJECTION, SOLUTION INTRAVENOUS at 21:17

## 2017-01-01 RX ADMIN — LEVOTHYROXINE SODIUM 125 MCG: 125 TABLET ORAL at 05:16

## 2017-01-01 RX ADMIN — FUROSEMIDE 40 MG: 10 INJECTION INTRAMUSCULAR; INTRAVENOUS at 15:10

## 2017-01-01 RX ADMIN — FUROSEMIDE 40 MG: 10 INJECTION INTRAMUSCULAR; INTRAVENOUS at 10:46

## 2017-01-01 RX ADMIN — AMIODARONE HYDROCHLORIDE 200 MG: 200 TABLET ORAL at 09:40

## 2017-02-15 PROBLEM — R05.9 COUGH: Status: ACTIVE | Noted: 2017-01-01

## 2017-02-17 NOTE — PROGRESS NOTES
Subjective   Lissette Landis is a 76 y.o. female.     History of Present Illness reevaluation endocrine's office for pneumonia.  Patient states had cough and congestion past 2-3 weeks getting worse as mentioned..  Also some mild cognitive issues with recent death of  question mild depressive disorder.  History noted.  Last lab work noted.  Was placed on levofloxacin as mentioned.  States not drinking as much as she should but is taking her medication date said no increase in dyspnea.  Vital signs are noted.  X-ray e reviewed and it looks more like effusion on the right and left bases more on the right with question peripneumonic    The following portions of the patient's history were reviewed and updated as appropriate: allergies, current medications, past family history, past medical history, past social history, past surgical history and problem list.    Review of Systems   Constitutional: Negative for activity change, appetite change, fatigue and unexpected weight change.   HENT: Negative for trouble swallowing and voice change.    Eyes: Negative for redness and visual disturbance.   Respiratory: Positive for cough. Negative for wheezing.    Cardiovascular: Negative for chest pain and palpitations.   Gastrointestinal: Negative for abdominal pain, constipation, diarrhea, nausea and vomiting.   Genitourinary: Negative for urgency.   Musculoskeletal: Negative for joint swelling.   Neurological: Negative for syncope and headaches.   Hematological: Negative for adenopathy.   Psychiatric/Behavioral: Negative for sleep disturbance.       Objective   Physical Exam   HENT:   Head: Normocephalic.   Right Ear: External ear normal.   Left Ear: External ear normal.   Eyes: Pupils are equal, round, and reactive to light.   Neck: Normal range of motion.   Cardiovascular: Normal rate, regular rhythm, normal heart sounds and intact distal pulses.  Exam reveals no gallop and no friction rub.    No murmur  heard.  Pulmonary/Chest: No respiratory distress. She has wheezes (rhonchi admitted lower lung fields.  Slight decreased breath sounds bases.  Respiratory rate 22 and unlabored.).   Abdominal: Soft.   Musculoskeletal: Normal range of motion.   Psychiatric: Her affect is blunt. Her speech is delayed. She is slowed.       Assessment/Plan   Problems Addressed this Visit     None      Visit Diagnoses     Pneumonia due to infectious organism, unspecified laterality, unspecified part of lung    -  Primary    Relevant Medications    albuterol (ACCUNEB) 1.25 MG/3ML nebulizer solution    Other Relevant Orders    Pulse Oximetry, Spot    Pleural effusion        Relevant Medications    albuterol (ACCUNEB) 1.25 MG/3ML nebulizer solution          We'll continue levofloxacin for now add nebulizer  absolute need for increase hydration and protein intake.  Check back again in about 5 days.  Then if not may progress to require inpatient therapy.  We will be concerned about the effusion is related to be evaluated more thoroughly when she is feeling better.  Also  need for pneumococcal pneumonia vaccine intervention later.  Previous history of low protein noted as well.

## 2017-02-22 NOTE — PROGRESS NOTES
Subjective   Lissette Landis is a 76 y.o. female.     History of Present Illness reevaluation peripneumonic effusion with infiltrate.  Is finishing up her antibiotics has about 3 more days worth.  Nebulizer seems to be helping.  Is eating and drinking better.  Vital signs are noted.    The following portions of the patient's history were reviewed and updated as appropriate: allergies, current medications, past family history, past medical history, past social history, past surgical history and problem list.    Review of Systems   Constitutional: Positive for appetite change. Negative for activity change, fatigue and unexpected weight change.   HENT: Negative for trouble swallowing and voice change.    Eyes: Negative for redness and visual disturbance.   Respiratory: Negative for cough and wheezing.    Cardiovascular: Negative for chest pain and palpitations.   Gastrointestinal: Negative for abdominal pain, constipation, diarrhea, nausea and vomiting.   Genitourinary: Negative for urgency.   Musculoskeletal: Negative for joint swelling.   Neurological: Negative for syncope and headaches.   Hematological: Negative for adenopathy.   Psychiatric/Behavioral: Negative for sleep disturbance.       Objective   Physical Exam   HENT:   Head: Normocephalic.   Eyes: Pupils are equal, round, and reactive to light.   Neck: Normal range of motion.   Cardiovascular: Normal rate, regular rhythm, normal heart sounds and intact distal pulses.  Exam reveals no gallop and no friction rub.    No murmur heard.  Pulmonary/Chest: She has rhonchi in the right middle field, the right lower field, the left middle field and the left lower field. She has no rales.   Resp rate 20  no accessory muscle movement last rhonchi in the bases slightly lessened decreased breath sounds   Neurological: She has normal strength.   Psychiatric: Her affect is blunt. Her speech is delayed.       Assessment/Plan   Problems Addressed this Visit     None       Visit Diagnoses     Pneumonia of both lungs due to infectious organism, unspecified part of lung    -  Primary    Relevant Orders    Pulse Oximetry, Spot    XR Chest PA & Lateral    Pleural effusion associated with pulmonary infection        Relevant Orders    Pulse Oximetry, Spot    XR Chest PA & Lateral          We'll finish up all medicines nebulizers counseled again on calorie nutrition and hydration.  Recheck in 2 weeks with repeat chest film prior.  Also then start pneumonia vaccine series.

## 2017-03-09 PROBLEM — J18.9 PNEUMONIA OF BOTH LUNGS DUE TO INFECTIOUS ORGANISM: Status: ACTIVE | Noted: 2017-01-01

## 2017-03-09 PROBLEM — J90 PLEURAL EFFUSION: Status: ACTIVE | Noted: 2017-01-01

## 2017-03-09 PROBLEM — R60.9 DEPENDENT EDEMA: Status: ACTIVE | Noted: 2017-01-01

## 2017-03-17 PROBLEM — J90 PLEURAL EFFUSION, BILATERAL: Status: ACTIVE | Noted: 2017-01-01

## 2017-03-17 NOTE — PROGRESS NOTES
Pulmonary Consultation    Subjective     Chief Complaint   Patient presents with   • Pneumonia        History of Present Illness  Lissette Landis is a 76 y.o. female with a PMH significant for HTN, DM, and hypothyroidism who presents for evaluation of dyspnea. Pt states she had issues beginning in December when she was placed on a thyroid medicine. She developed LE edema, so the medicine was stopped. Pt has had increased fatigue which was attributed to some recent family losses. She was noted to be wheezing in January so a CXR was obtained which showed effusions. Pt was given Levaquin for possible pneumonia and followed up with Dr. Guajardo. He recommended completing the abx and using albuterol prn. A follow-up CXR showed persistent effusions, but she has not seen a cardiologist. Her edema has been attributed to malnutrition but no echocardiogram has been ordered. She has been given lasix to use prn, but she states despite taking it daily her edema persists. Pt reports her wt is normally around 112 but she is consistently up to 125. She cannot wear her own shoes due to edema. Pt still has some cough, but it is improved. She has had some wheeze. Pt is a never smoker and has no history of lung disease. 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 Her mother had TB and her father had COPD.     Review of Systems: History obtained from chart review and the patient.  Review of Systems   Constitutional: Positive for fatigue and unexpected weight change. Negative for fever.   Respiratory: Positive for cough, shortness of breath and wheezing.    Cardiovascular: Positive for leg swelling. Negative for chest pain and palpitations.   Gastrointestinal: Negative for abdominal distention.   Musculoskeletal: Negative for arthralgias.     As described in the HPI. Otherwise, remainder of ROS (14 systems) were negative.    Patient Active Problem List   Diagnosis   • Benign hypertension   • Osteoporosis   •  Postoperative hypothyroidism   • Edema   • Fatigue   • Prediabetes   • Hypoalbuminemia due to protein-calorie malnutrition   • Depression   • Cough   • Pneumonia of both lungs due to infectious organism   • Dependent edema   • Pleural effusion, bilateral         Current Outpatient Prescriptions:   •  albuterol (ACCUNEB) 1.25 MG/3ML nebulizer solution, Take 3 mL by nebulization Every 6 (Six) Hours As Needed for wheezing., Disp: 60 vial, Rfl: 12  •  albuterol (VENTOLIN HFA) 108 (90 BASE) MCG/ACT inhaler, Inhale 2 puffs Every 4 (Four) Hours As Needed for wheezing., Disp: 8 g, Rfl: 1  •  Ascorbic Acid (VITAMIN C PO), Take 1 tablet by mouth Daily. Vitamin C With Sparkle Hips oral, Disp: , Rfl:   •  Cholecalciferol (VITAMIN D3 PO), Take 1 tablet by mouth Daily., Disp: , Rfl:   •  Cyanocobalamin (VITAMIN B-12 PO), Take 1 tablet by mouth Daily., Disp: , Rfl:   •  furosemide (LASIX) 20 MG tablet, Take 1 tablet by mouth Daily., Disp: 30 tablet, Rfl: 1  •  levothyroxine (SYNTHROID) 112 MCG tablet, Take 1 tablet by mouth Daily., Disp: 30 tablet, Rfl: 3    Past Medical History   Diagnosis Date   • Abnormal mammogram, unspecified    • Benign hypertension    • Fibroadenoma of breast    • Fibrocystic disease of breast      History of, bilateral, extensive     • History of bone density study 03/07/2014     Osteoporosis   • History of diagnostic mammography 05/07/2014     [place] [address] [facility] (Abnormal mammogram, unspecified) , Reason: 6WK DX LEFT MAMMOGRAM AND ULTRASOUND   • History of echocardiogram 01/30/2014     Echocardiogram W/ color flow 78165 (Normal LV systolic function with EF of approx. 60%. Early diastolic dysfunction. Mild aortic and tricuspid regurg. Normal RV size and function. No intracardiac mass, pericardial effusion or cardiac thrombus seen.)   • History of mammogram 02/07/2014     BI-RADS Category 0   • Hypercalcemia    • Hyperparathyroidism      parathyroidectomy 3-16-15      •  "Hyperproteinemia    • Hyperthyroidism    • Mammogram abnormal      LEFT side with 2 areas of microcalcification    • Osteoporosis    • Postoperative hypothyroidism    • Sensorineural hearing loss, bilateral    • Thyroid nodule      left cold- benign- left thyroidectomy    • Toxic multinodular goiter      right hot----right thyroidectomy   • Vitamin D deficiency      Past Surgical History   Procedure Laterality Date   • Lipoma excision  03/27/1997     Biopsy, abdominal mass (Mass, abdominal wall, probably a large lipoma. Excision of lipoma.)   • Colonoscopy  03/06/2014     normal   • Mammo diagnostic unilateral  02/26/2014     DIAG MAMM, UNILATERAL DIGITAL  (Medicare) (Abnormal mammogram, unspecified) , Reason: LT BREAST   • Injection of medication  01/10/2014     Kenalog (1)      • Breast biopsy  03/21/2014     Biopsy of the left breast in 2 areas using stereotactic guidance and a rotating biopsy device with clip placement.   • Thyroidectomy  03/16/2015     Thyroid nodules and hyperparathyroidism. Total thyroidectomy and parathyroidectomy.     Social History     Social History   • Marital status:      Spouse name: N/A   • Number of children: N/A   • Years of education: N/A     Social History Main Topics   • Smoking status: Never Smoker   • Smokeless tobacco: None   • Alcohol use No   • Drug use: None   • Sexual activity: Not Asked     Other Topics Concern   • None     Social History Narrative     Family History   Problem Relation Age of Onset   • Lupus Father      systemic   • Cancer Other           Objective     Blood pressure 102/63, pulse 85, height 60\" (152.4 cm), weight 124 lb (56.2 kg), SpO2 93 %.  Physical Exam   Constitutional: She is oriented to person, place, and time. Vital signs are normal. She appears well-developed and well-nourished.   HENT:   Head: Normocephalic and atraumatic.   Nose: Nose normal.   Mouth/Throat: Uvula is midline, oropharynx is clear and moist and mucous membranes are " normal.   Mallampati 3   Eyes: Conjunctivae, EOM and lids are normal. Pupils are equal, round, and reactive to light.   Neck: Trachea normal and normal range of motion. No tracheal tenderness present. No thyroid mass present.   Cardiovascular: Normal rate, regular rhythm and normal heart sounds.  Exam reveals no gallop.    No murmur heard.  Pulmonary/Chest: Effort normal. No respiratory distress. She has decreased breath sounds in the right lower field and the left lower field. She has no wheezes. She has no rhonchi. Chest wall is dull to percussion (bilateral bases). She exhibits no tenderness.   Abdominal: Soft. Normal appearance and bowel sounds are normal. There is no tenderness.   Musculoskeletal:        Right knee: She exhibits swelling.        Left knee: She exhibits swelling.        Right ankle: She exhibits swelling.        Left ankle: She exhibits swelling.   Lymphadenopathy:        Head (right side): No submandibular adenopathy present.        Head (left side): No submandibular adenopathy present.     She has no cervical adenopathy.        Right: No supraclavicular adenopathy present.        Left: No supraclavicular adenopathy present.   Neurological: She is alert and oriented to person, place, and time.   Skin: Skin is warm and dry. No cyanosis. Nails show no clubbing.   Psychiatric: She has a normal mood and affect. Her speech is normal and behavior is normal. Judgment normal.   Nursing note and vitals reviewed.      PFTs: 3/17/17  Ratio 88  FVC 1.11/ 49%  FEV1 0.98/ 58%  Probable restriction.  No comparative data available.     Radiology (independently reviewed and interpreted by me): CXR 3/9/17 showed moderate bilateral pleural effusions with compressive atelectasis, pulmonary vascular congestion and cardiomegaly.       Assessment/Plan     Lissette was seen today for pneumonia.    Diagnoses and all orders for this visit:    Dyspnea on exertion    Pleural effusion, bilateral    Heart failure, unspecified  heart failure chronicity, unspecified heart failure type  -     Ambulatory Referral to Cardiology  -     Adult Transthoracic Echo Complete With Contrast; Future    Hypervolemia, unspecified hypervolemia type  -     Ambulatory Referral to Cardiology  -     Adult Transthoracic Echo Complete With Contrast; Future    Cardiomegaly   -     Adult Transthoracic Echo Complete With Contrast; Future    Other orders  -     furosemide (LASIX) 20 MG tablet; Take 1 tablet by mouth Daily.         Discussion/ Recommendations:   Symptoms, chest x-ray, and exam most consistent with volume overload, concerning for heart failure.  I do not think the patient had pneumonia or underlying lung disease.  She is at least 12 pounds over her normal weight and exhibits bilateral pleural effusions, vascular congestion and cardiomegaly on chest x-ray.  Spirometry most consistent with restriction which would be accounted by heart failure and pleural effusions.    -Recommend patient use Lasix 20 mg tabs daily for now.  -Urgent referral to Dr. Meadows for evaluation of heart failure.  -TTE ordered and to be read by Dr. Meadows.  -No indication for bronchodilators.  Okay to stop albuterol.         Return if symptoms worsen or fail to improve.      Thank you for allowing me to participate in the care of Lissette Landis. Please do not hesitate to contact me with any questions.         This document has been electronically signed by Joanne Baires MD on March 17, 2017 11:08 AM      EMR Dragon/Transcription disclaimer:     Much of this encounter note is an electronic transcription/translation of spoken language to printed text. The electronic translation of spoken language may permit erroneous, or at times, nonsensical words or phrases to be inadvertently transcribed; Although I have reviewed the note for such errors, some may still exist.

## 2017-03-24 NOTE — PROGRESS NOTES
Cardiovascular Medicine   Emeterio Meadows M.D., Ph.D., [facility]      Joanne Baires MD  [address] [place] [address] [place] [address]  Thank you for asking me to see Lissette Landis for Dyspnea concerning for congestive heart failure.    History of Present Illness  This is a 76 y.o. female with:    1. Dyspnea  2. LE edema  3. Hypothyroidism    Dyspnea  Patient's referred for dyspnea concerning for congestive failure.  The patient tells me that these issues really started in December 2016.  She was diagnosed with hypothyroidism and placed on thyroid replacement hormone therapy.  She tells me that she was having increasing fatigue and dyspnea on exertion.  She also had increasing lower extremity edema.  This difficult for her to place her socks or shoes on at that time.  She saw her PCP who thought that this was side effects from her thyroid replacement therapy.  This apparently was discontinued.  On physical exam there was concerns for wheezing.  Chest x-ray showed bilateral pleural effusions.  No overt infiltrates.  She was then placed on antibiotics and presumably treated for CAP.  There is no echocardiogram or BNP obtained.  She was also prescribed albuterol with minimal effect.  Chest x-ray was repeated and continued to show bilateral pleural effusions.  She was then prescribed as needed Lasix.  She recently saw Dr. Baires in pulmonary who thought her presentation was more consistent with congestive heart failure.  Echocardiogram has been ordered, but scheduled currently for 30 March.  Patient denies prior history of MI or heart failure.    Edema  Patient's been having increasing lower extremity edema as noted above.  No prior episodes of DVT or PE.  This is bilateral.  It's not really been responsive to Lasix.  She's had some lower extremity discomfort.  She is unable to place her shoes.    Review of Systems - History obtained from chart review and the patient  General ROS: positive for  -  fatigue  Cardiovascular ROS: positive for - dyspnea on exertion and edema.  All other systems were reviewed and were negative.    family history includes Cancer in her other; Lupus in her father.     reports that she has never smoked. She does not have any smokeless tobacco history on file. She reports that she does not drink alcohol.    Allergies   Allergen Reactions   • Sulfa Antibiotics GI Intolerance         Current Outpatient Prescriptions:   •  albuterol (ACCUNEB) 1.25 MG/3ML nebulizer solution, Take 3 mL by nebulization Every 6 (Six) Hours As Needed for wheezing., Disp: 60 vial, Rfl: 12  •  albuterol (VENTOLIN HFA) 108 (90 BASE) MCG/ACT inhaler, Inhale 2 puffs Every 4 (Four) Hours As Needed for wheezing., Disp: 8 g, Rfl: 1  •  Ascorbic Acid (VITAMIN C PO), Take 1 tablet by mouth Daily. Vitamin C With Sparkle Hips oral, Disp: , Rfl:   •  Cholecalciferol (VITAMIN D3 PO), Take 1 tablet by mouth Daily., Disp: , Rfl:   •  Cyanocobalamin (VITAMIN B-12 PO), Take 1 tablet by mouth Daily., Disp: , Rfl:   •  furosemide (LASIX) 20 MG tablet, Take 1 tablet by mouth Daily., Disp: 30 tablet, Rfl: 1  •  levothyroxine (SYNTHROID) 112 MCG tablet, Take 1 tablet by mouth Daily., Disp: 30 tablet, Rfl: 3    Physical Exam:  Vitals:    03/24/17 1309   BP: (!) 88/64   Pulse:    SpO2:      Body mass index is 25.12 kg/(m^2).    GEN: alert, appears stated age and cooperative  Body Habitus: well-nourished  Neuro: CN II-XII grossly intact.   HEENT: Head: Normocephalic, no lesions, without obvious abnormality.  Neck / Thyroid: Supple, no masses, nodes, nodules or enlargement. No arcus senilis, xanthelasma or xanthomas. PERRL. Normal external ears. No drainage. No thyromegaly. Neck supple. No LAD. Trachea midline. Nose, normal.  JVP: 12 cm of water at 45 degrees HJR: absent      Carotid:  Upstroke: easily palpated bilaterally Volume: Normal.    Carotid Bruit:  None  Subclavian Bruit: Not present.    Lymph: No overt LAD.   Back:  Normal.  Chest:  Normal Excursion: Good    I:E: Good  Pulmonary:clear to auscultation, no wheezes, rales or rhonchi, symmetric air entry. Equal chest excursion. Chest physical exam is normal. No tenderness.        Precordium:  No palpable heaves or thrusts. P2 is not palpable.   [place]:  normal size and placement Palpable S4: Not present.   Heart rate: normal  Heart Rhythm: regular     Heart Sounds: S1: normal intensity  S2: normal intensity  S3: absent   S4: absent  Opening Snap: absent  A2-OS:  N/A  Pericardial rub: absent    Ejection click: None      Murmurs: Systolic: none  Diastolic: none  Abdomen: Soft, non-tender, normal bowel sounds; no bruits, organomegaly or masses.  Extremity: 1+ lower extremity edema bilaterally  Pulses: Right radial artery has 2+ (normal) pulse and Left radial artery has 2+ (normal) pulse    DATA REVIEWED:     Pulmonary notes from Dr. Baires were reviewed showing concern for congestive failure.  She was referred to my clinic.  Echocardiogram was ordered.    FINDINGS: Two views of the chest reveal small to moderate  bilateral pleural effusions with areas of atelectasis in both  lung bases. The appearance is quite similar to the prior study.  The heart is moderately enlarged. The pulmonary vessels are  unremarkable. The lungs are otherwise clear. There is no  pneumothorax. Previously noted right upper lobe infiltrate is no  longer seen.     IMPRESSION:  Stable mild to moderate bilateral pleural effusions  with bibasilar atelectasis. Cardiomegaly. Resolution of  previously noted right upper lobe infiltrate.        Ref Range & Units 2wk ago     Magnesium 1.6 - 2.3 mg/dL 1.5 (L)   Resulting Agency  [facility]       Assessment/Plan      1.  Concerns for congestive heart failure.  Patient has symptoms consistent with congestive heart failure.  She has signs of elevated left-sided filling pressures on physical examination today.  Today, she is moderately hypervolemic and well-perfused.  Will need  to evaluate her left ventricular ejection fraction with echocardiogram.  She also needs diuresis.  She also will need an ischemia evaluation.  She is unable to exercise because of severe exercise intolerance.  -Start Bumex 1 mg a day  -Sodium restriction less than 2 g a day; 2 L fluid restriction  -Agree with echocardiogram; will send basic labs as well as BNP  -Will arrange a Lexiscan myocardial perfusion stress as she has numerous risk factors for coronary disease  -She will follow-up with  in one week for a volume assessment, medication check and heart failure information cessation  -I will plan on seeing her back in 2 weeks.    2.  Lower extremity edema.  I suspect this is from congestive failure.  -Diuretic changes as noted above with dietary restrictions  -Will arrange venous duplex    3.  Hypomagnesemia likely from use of furosemide.  -Start magnesium oxide; follow-up in one week with     4. Tobacco status: non-smoker       Plan for follow-up: in 2 weeks      EMR Dragon/Transcription disclaimer:     Much of this encounter note is an electronic transcription. This may permit erroneous, or at times, nonsensical words or phrases to be inadvertently transcribed; Although I have reviewed the note for such errors, some may still exist.

## 2017-03-30 PROBLEM — I50.21 ACUTE SYSTOLIC HEART FAILURE (HCC): Status: ACTIVE | Noted: 2017-01-01

## 2017-03-30 NOTE — PROGRESS NOTES
Subjective:     Chief Complaint   Patient presents with   • Congestive Heart Failure     Follow up     Ms Landis indicates onset of [place] OCA, weight gain, BLE edema October 2016.  Her daughter is present with her today. TTE has been performed today prior to office visit with preliminary review: EF 30%, LVH, severe TR, bilateral pleural effusions. Pending review per Dr Meadows.    Congestive Heart Failure   Presents for follow-up visit. Associated symptoms include edema, fatigue, shortness of breath (NYHA Class III) and unexpected weight change. Pertinent negatives include no abdominal pain, chest pain, chest pressure, claudication, near-syncope, nocturia, orthopnea, palpitations or paroxysmal nocturnal dyspnea. The symptoms have been worsening. Compliance problems include adherence to exercise.  Compliance with diet is %. Compliance with exercise is 0-25%. Compliance with medications is %.     Review of Systems   Constitution: Positive for fatigue, weakness, malaise/fatigue, unexpected weight change and weight gain (34-40# since October 2016). Negative for chills, decreased appetite and fever.   HENT: Negative for congestion.    Cardiovascular: Positive for leg swelling. Negative for chest pain, claudication, irregular heartbeat, near-syncope, orthopnea, palpitations, paroxysmal nocturnal dyspnea and syncope. Dyspnea on exertion: NYHA Class III.   Respiratory: Positive for shortness of breath (NYHA Class III). Negative for snoring.    Hematologic/Lymphatic: Does not bruise/bleed easily.   Gastrointestinal: Negative for bloating and abdominal pain.   Genitourinary: Negative for nocturia.   Neurological: Negative for dizziness, light-headedness and vertigo.     Past Medical History:   Diagnosis Date   • Abnormal mammogram, unspecified    • Benign hypertension    • Fibroadenoma of breast    • Fibrocystic disease of breast     History of, bilateral, extensive     • History of bone density study 03/07/2014     Osteoporosis   • History of diagnostic mammography 05/07/2014    [place] [address] [facility] (Abnormal mammogram, unspecified) , Reason: 6WK DX LEFT MAMMOGRAM AND ULTRASOUND   • History of echocardiogram 01/30/2014    Echocardiogram W/ color flow 04228 (Normal LV systolic function with EF of approx. 60%. Early diastolic dysfunction. Mild aortic and tricuspid regurg. Normal RV size and function. No intracardiac mass, pericardial effusion or cardiac thrombus seen.)   • History of mammogram 02/07/2014    BI-RADS Category 0   • Hypercalcemia    • Hyperparathyroidism     parathyroidectomy 3-16-15      • Hyperproteinemia    • Hyperthyroidism    • Mammogram abnormal     LEFT side with 2 areas of microcalcification    • Osteoporosis    • Postoperative hypothyroidism    • Sensorineural hearing loss, bilateral    • Thyroid nodule     left cold- benign- left thyroidectomy    • Toxic multinodular goiter     right hot----right thyroidectomy   • Vitamin D deficiency      Past Surgical History:   Procedure Laterality Date   • BREAST BIOPSY  03/21/2014    Biopsy of the left breast in 2 areas using stereotactic guidance and a rotating biopsy device with clip placement.   • COLONOSCOPY  03/06/2014    normal   • INJECTION OF MEDICATION  01/10/2014    Kenalog (1)      • LIPOMA EXCISION  03/27/1997    Biopsy, abdominal mass (Mass, abdominal wall, probably a large lipoma. Excision of lipoma.)   • MAMMO DIAGNOSTIC UNILATERAL  02/26/2014    DIAG MAMM, UNILATERAL DIGITAL  (Medicare) (Abnormal mammogram, unspecified) , Reason: LT BREAST   • THYROIDECTOMY  03/16/2015    Thyroid nodules and hyperparathyroidism. Total thyroidectomy and parathyroidectomy.     Social History     Social History   • Marital status:      Spouse name: N/A   • Number of children: N/A   • Years of education: N/A     Social History Main Topics   • Smoking status: Never Smoker   • Smokeless tobacco: Never Used   • Alcohol use No   • Drug  "use: No   • Sexual activity: Not Asked     Other Topics Concern   • None     Social History Narrative       Current Outpatient Prescriptions   Medication Sig Dispense Refill   • albuterol (ACCUNEB) 1.25 MG/3ML nebulizer solution Take 3 mL by nebulization Every 6 (Six) Hours As Needed for wheezing. 60 vial 12   • albuterol (VENTOLIN HFA) 108 (90 BASE) MCG/ACT inhaler Inhale 2 puffs Every 4 (Four) Hours As Needed for wheezing. 8 g 1   • Ascorbic Acid (VITAMIN C PO) Take 1 tablet by mouth Daily. Vitamin C With Sparkle Hips oral     • bumetanide (BUMEX) 2 MG tablet Take 0.5 tablets by mouth Daily. 31 tablet 0   • Cholecalciferol (VITAMIN D3 PO) Take 1 tablet by mouth Daily.     • Cyanocobalamin (VITAMIN B-12 PO) Take 1 tablet by mouth Daily.     • levothyroxine (SYNTHROID) 112 MCG tablet Take 1 tablet by mouth Daily. 30 tablet 3   • Magnesium Oxide 200 MG tablet Take 1 tablet by mouth 2 (Two) Times a Day. 60 tablet 0   • potassium chloride (K-DUR,KLOR-CON) 20 MEQ CR tablet Take 2 tablets today 03/30/2017 with evening meal/Take 1 tablet with breakfast on 03/31/2017 and 1 tablet with lunch on 03/31/2017 30 tablet 0     No current facility-administered medications for this visit.      Objective:     Vitals:    03/30/17 1104   BP: 100/62   BP Location: Left arm   Patient Position: Sitting   Cuff Size: Adult   Pulse: 88   SpO2: 94%   Weight: 125 lb 9 oz (57 kg)   Height: 60\" (152.4 cm)      Physical Exam   Constitutional: She is oriented to person, place, and time. She appears well-developed and well-nourished. No distress.   HENT:   Head: Normocephalic and atraumatic.   Neck: JVD (14 cm @ 45 degrees) present.   Cardiovascular: Normal rate and regular rhythm.  Exam reveals no gallop.    No murmur heard.  Pulmonary/Chest: Effort normal. No respiratory distress (diminished bibasilar). She has no wheezes.   Abdominal: She exhibits no distension.   Musculoskeletal: She exhibits edema.   Neurological: She is alert and oriented to " person, place, and time.   Skin: Skin is warm and dry. She is not diaphoretic.   Psychiatric: She has a normal mood and affect. Her behavior is normal. Judgment and thought content normal.   Vitals reviewed.    Labs:  Lab Results   Component Value Date    GLUCOSE 84 03/30/2017    CALCIUM 9.8 03/30/2017     03/30/2017    K 3.3 (L) 03/30/2017    CO2 29.0 03/30/2017    CL 97 03/30/2017    BUN 24 (H) 03/30/2017    CREATININE 1.11 (H) 03/30/2017    EGFRIFNONA 48 (L) 03/30/2017    BCR 21.6 03/30/2017    ANIONGAP 11.0 03/30/2017     Lab Results   Component Value Date    WBC 4.55 03/24/2017    HGB 12.3 03/24/2017    HCT 35.5 03/24/2017    MCV 88.3 03/24/2017     03/24/2017     No results found for: CHOL  Lab Results   Component Value Date    TRIG 144 01/29/2014     Lab Results   Component Value Date    HDL 29 (L) 01/29/2014     Lab Results   Component Value Date    LDLCALC 118 01/29/2014     No results found for: LDL  No results found for: HDLLDLRATIO  No components found for: CHOLHDL  Lab Results   Component Value Date    TSH 8.740 (H) 03/24/2017     The following portions of the patient's history were reviewed and updated as appropriate: allergies, current medications, past family history, past medical history, past social history, past surgical history and problem list.     Assessment:      Diagnosis Plan   1. Acute systolic heart failure  Basic Metabolic Panel    potassium chloride (K-DUR,KLOR-CON) 20 MEQ CR tablet    furosemide (LASIX) injection 40 mg    furosemide (LASIX) injection 40 mg    furosemide (LASIX) injection 20 mg   2. Hyperthyroidism     3. Postoperative hypothyroidism     4. Drug therapy  Basic Metabolic Panel    potassium chloride (K-DUR,KLOR-CON) 20 MEQ CR tablet    furosemide (LASIX) injection 40 mg    furosemide (LASIX) injection 40 mg    furosemide (LASIX) injection 20 mg     Ms Landis presents with new diagnosis of HFrEF for which there is clinical evidence of hypervolemia.     Plan:    Discussion with Dr Droi burger: preliminary echocardiographic findings with clinical evidence of hypervolemia noting hemodynamic status.   We will proceed with OP diuresis with planned close follow up. Further recommendations will be based upon follow up findings/response.     Presented an overview of heart failure, expected course, considerations, risk factors and exacerbation prevention.  Recommended daily weight monitoring.  Recommended restricted dietary sodium intake.  Discussed patient action plan for heart failure;  Recommended avoiding NSAIDs use.  Discussed warning signs requiring additional medical attention for heart failure.    Additional educational will be provided as we go forward as this is a great deal to comprehend in one visit.     Ms Landis has been scheduled for ischemic work up.

## 2017-03-31 NOTE — PROGRESS NOTES
Subjective:     Chief Complaint   Patient presents with   • Follow-up     chief complaint     Ms Landis presents in follow up of aggressive out patient diuresis.   She indicates significant increase in urinary output over the past 24 hours. Overall, she feels better today re: exercise tolerance.     Congestive Heart Failure   Presents for follow-up visit. Associated symptoms include edema, fatigue, shortness of breath (NYHA Class III) and unexpected weight change. Pertinent negatives include no abdominal pain, chest pain, chest pressure, claudication, near-syncope, nocturia, orthopnea, palpitations or paroxysmal nocturnal dyspnea. The symptoms have been improving. Compliance problems include adherence to exercise.  Compliance with diet is %. Compliance with exercise is 0-25%. Compliance with medications is %.     Review of Systems   Constitution: Positive for fatigue, weakness, malaise/fatigue and unexpected weight change. Negative for chills, decreased appetite, fever and weight gain (34-40# since October 2016).   HENT: Negative for congestion.    Cardiovascular: Positive for leg swelling. Negative for chest pain, claudication, irregular heartbeat, near-syncope, orthopnea, palpitations, paroxysmal nocturnal dyspnea and syncope. Dyspnea on exertion: NYHA Class III.   Respiratory: Positive for shortness of breath (NYHA Class III). Negative for snoring.    Hematologic/Lymphatic: Does not bruise/bleed easily.   Gastrointestinal: Negative for bloating and abdominal pain.   Genitourinary: Negative for nocturia.   Neurological: Negative for dizziness, light-headedness and vertigo.     Past Medical History:   Diagnosis Date   • Abnormal mammogram, unspecified    • Benign hypertension    • Fibroadenoma of breast    • Fibrocystic disease of breast     History of, bilateral, extensive     • History of bone density study 03/07/2014    Osteoporosis   • History of diagnostic mammography 05/07/2014    M[place] [address] [facility] (Abnormal mammogram, unspecified) , Reason: 6WK DX LEFT MAMMOGRAM AND ULTRASOUND   • History of echocardiogram 01/30/2014    Echocardiogram W/ color flow 29855 (Normal LV systolic function with EF of approx. 60%. Early diastolic dysfunction. Mild aortic and tricuspid regurg. Normal RV size and function. No intracardiac mass, pericardial effusion or cardiac thrombus seen.)   • History of mammogram 02/07/2014    BI-RADS Category 0   • Hypercalcemia    • Hyperparathyroidism     parathyroidectomy 3-16-15      • Hyperproteinemia    • Hyperthyroidism    • Mammogram abnormal     LEFT side with 2 areas of microcalcification    • Osteoporosis    • Postoperative hypothyroidism    • Sensorineural hearing loss, bilateral    • Thyroid nodule     left cold- benign- left thyroidectomy    • Toxic multinodular goiter     right hot----right thyroidectomy   • Vitamin D deficiency      Past Surgical History:   Procedure Laterality Date   • BREAST BIOPSY  03/21/2014    Biopsy of the left breast in 2 areas using stereotactic guidance and a rotating biopsy device with clip placement.   • COLONOSCOPY  03/06/2014    normal   • INJECTION OF MEDICATION  01/10/2014    Kenalog (1)      • LIPOMA EXCISION  03/27/1997    Biopsy, abdominal mass (Mass, abdominal wall, probably a large lipoma. Excision of lipoma.)   • MAMMO DIAGNOSTIC UNILATERAL  02/26/2014    DIAG MAMM, UNILATERAL DIGITAL  (Medicare) (Abnormal mammogram, unspecified) , Reason: LT BREAST   • THYROIDECTOMY  03/16/2015    Thyroid nodules and hyperparathyroidism. Total thyroidectomy and parathyroidectomy.     Social History     Social History   • Marital status:      Spouse name: N/A   • Number of children: N/A   • Years of education: N/A     Social History Main Topics   • Smoking status: Never Smoker   • Smokeless tobacco: Never Used   • Alcohol use No   • Drug use: No   • Sexual activity: Not Asked     Other Topics Concern   • None  "    Social History Narrative     Current Outpatient Prescriptions   Medication Sig Dispense Refill   • albuterol (ACCUNEB) 1.25 MG/3ML nebulizer solution Take 3 mL by nebulization Every 6 (Six) Hours As Needed for wheezing. 60 vial 12   • albuterol (VENTOLIN HFA) 108 (90 BASE) MCG/ACT inhaler Inhale 2 puffs Every 4 (Four) Hours As Needed for wheezing. 8 g 1   • Ascorbic Acid (VITAMIN C PO) Take 1 tablet by mouth Daily. Vitamin C With Sparkle Hips oral     • bumetanide (BUMEX) 2 MG tablet Take 0.5 tablets by mouth Daily. 31 tablet 0   • Cholecalciferol (VITAMIN D3 PO) Take 1 tablet by mouth Daily.     • Cyanocobalamin (VITAMIN B-12 PO) Take 1 tablet by mouth Daily.     • levothyroxine (SYNTHROID) 112 MCG tablet Take 1 tablet by mouth Daily. 30 tablet 3   • Magnesium Oxide 200 MG tablet Take 1 tablet by mouth 2 (Two) Times a Day. 60 tablet 0   • potassium chloride (K-DUR,KLOR-CON) 20 MEQ CR tablet Take 2 tablets today 03/30/2017 with evening meal/Take 1 tablet with breakfast on 03/31/2017 and 1 tablet with lunch on 03/31/2017 30 tablet 0     Current Facility-Administered Medications   Medication Dose Route Frequency Provider Last Rate Last Dose   • furosemide (LASIX) injection 20 mg  20 mg Subcutaneous Once BEN Lin       • furosemide (LASIX) injection 40 mg  40 mg Subcutaneous Once BEN Lin       • furosemide (LASIX) injection 40 mg  40 mg Subcutaneous Once BEN Lin         Objective:     Vitals:    03/31/17 1437   BP: 98/62   BP Location: Left arm   Patient Position: Sitting   Cuff Size: Adult   Pulse: 84   SpO2: 92%   Weight: 122 lb 2 oz (55.4 kg)   Height: 60\" (152.4 cm)      Physical Exam   Constitutional: She is oriented to person, place, and time. She appears well-nourished. No distress.   HENT:   Head: Normocephalic and atraumatic.   Neck: JVD (12 cm @ 45 degrees) present.   Pulmonary/Chest: Effort normal. No respiratory distress.   Neurological: She is alert " and oriented to person, place, and time.   Skin: Skin is warm and dry. She is not diaphoretic.   Psychiatric: She has a normal mood and affect. Her behavior is normal. Judgment and thought content normal.     Labs:  Lab Results   Component Value Date    GLUCOSE 84 03/30/2017    CALCIUM 9.8 03/30/2017     03/30/2017    K 3.3 (L) 03/30/2017    CO2 29.0 03/30/2017    CL 97 03/30/2017    BUN 24 (H) 03/30/2017    CREATININE 1.11 (H) 03/30/2017    EGFRIFNONA 48 (L) 03/30/2017    BCR 21.6 03/30/2017    ANIONGAP 11.0 03/30/2017     Lab Results   Component Value Date    WBC 4.55 03/24/2017    HGB 12.3 03/24/2017    HCT 35.5 03/24/2017    MCV 88.3 03/24/2017     03/24/2017     No results found for: CHOL  Lab Results   Component Value Date    TRIG 144 01/29/2014     Lab Results   Component Value Date    HDL 29 (L) 01/29/2014     Lab Results   Component Value Date    LDLCALC 118 01/29/2014     No results found for: LDL  No results found for: HDLLDLRATIO  No components found for: CHOLHDL  Lab Results   Component Value Date    TSH 8.740 (H) 03/24/2017     The following portions of the patient's history were reviewed and updated as appropriate: allergies, current medications, past family history, past medical history, past social history, past surgical history and problem list.     Assessment:      Diagnosis Plan   1. Acute systolic heart failure  Basic Metabolic Panel    BNP    furosemide (LASIX) injection 40 mg    furosemide (LASIX) injection 40 mg    furosemide (LASIX) injection 20 mg   2. Benign hypertension       Ms Landis presents with new diagnosis of HFrEF for which there is clinical evidence of hypervolemia.     Plan:     We will proceed with OP diuresis with planned close follow up. Further recommendations will be based upon follow up findings/response.     Recommended daily weight monitoring.  Recommended restricted dietary sodium intake.  Discussed patient action plan for heart failure;  Recommended  avoiding NSAIDs use.  Discussed warning signs requiring additional medical attention for heart failure.    Additional educational will be provided as we go forward as this is a great deal to comprehend in one visit.     Ms Landis has been scheduled for ischemic work up.    Will supplement K+ as assessed.

## 2017-04-03 NOTE — PROGRESS NOTES
Subjective:     Chief Complaint   Patient presents with   • Congestive Heart Failure     chief complaint     Ms Landis presents in follow up of aggressive out patient diuresis.   She does feel that she had a good response with the last lasix injection.    Congestive Heart Failure   Presents for follow-up visit. Associated symptoms include edema (no improvement following Friday's subq lasix), fatigue, shortness of breath (NYHA Class III) and unexpected weight change. Pertinent negatives include no abdominal pain, chest pain, chest pressure, claudication, near-syncope, nocturia, orthopnea, palpitations or paroxysmal nocturnal dyspnea. Compliance problems include adherence to exercise.  Compliance with diet is %. Compliance with exercise is 0-25%. Compliance with medications is %.     Review of Systems   Constitution: Positive for fatigue, weakness, malaise/fatigue and unexpected weight change. Negative for chills, decreased appetite, fever, weight gain (34-40# since October 2016) and weight loss.   HENT: Negative for congestion.    Cardiovascular: Positive for leg swelling (no improvement 2+ BLE). Negative for chest pain, claudication, irregular heartbeat, near-syncope, orthopnea, palpitations, paroxysmal nocturnal dyspnea and syncope. Dyspnea on exertion: NYHA Class III.   Respiratory: Positive for shortness of breath (NYHA Class III). Negative for snoring.    Hematologic/Lymphatic: Does not bruise/bleed easily.   Gastrointestinal: Negative for bloating and abdominal pain.   Genitourinary: Negative for nocturia.   Neurological: Negative for dizziness, light-headedness and vertigo.     Past Medical History:   Diagnosis Date   • Abnormal mammogram, unspecified    • Benign hypertension    • CHF (congestive heart failure)    • Fibroadenoma of breast    • Fibrocystic disease of breast     History of, bilateral, extensive     • History of bone density study 03/07/2014    Osteoporosis   • History of diagnostic  mammography 05/07/2014    [place] [address] [facility] (Abnormal mammogram, unspecified) , Reason: 6WK DX LEFT MAMMOGRAM AND ULTRASOUND   • History of echocardiogram 01/30/2014    Echocardiogram W/ color flow 56376 (Normal LV systolic function with EF of approx. 60%. Early diastolic dysfunction. Mild aortic and tricuspid regurg. Normal RV size and function. No intracardiac mass, pericardial effusion or cardiac thrombus seen.)   • History of mammogram 02/07/2014    BI-RADS Category 0   • Hypercalcemia    • Hyperparathyroidism     parathyroidectomy 3-16-15      • Hyperproteinemia    • Hyperthyroidism    • Mammogram abnormal     LEFT side with 2 areas of microcalcification    • Osteoporosis    • Postoperative hypothyroidism    • Sensorineural hearing loss, bilateral    • Thyroid nodule     left cold- benign- left thyroidectomy    • Toxic multinodular goiter     right hot----right thyroidectomy   • Vitamin D deficiency      Past Surgical History:   Procedure Laterality Date   • BREAST BIOPSY  03/21/2014    Biopsy of the left breast in 2 areas using stereotactic guidance and a rotating biopsy device with clip placement.   • COLONOSCOPY  03/06/2014    normal   • INJECTION OF MEDICATION  01/10/2014    Kenalog (1)      • LIPOMA EXCISION  03/27/1997    Biopsy, abdominal mass (Mass, abdominal wall, probably a large lipoma. Excision of lipoma.)   • MAMMO DIAGNOSTIC UNILATERAL  02/26/2014    DIAG MAMM, UNILATERAL DIGITAL  (Medicare) (Abnormal mammogram, unspecified) , Reason: LT BREAST   • THYROIDECTOMY  03/16/2015    Thyroid nodules and hyperparathyroidism. Total thyroidectomy and parathyroidectomy.     Social History     Social History   • Marital status:      Spouse name: N/A   • Number of children: N/A   • Years of education: N/A     Social History Main Topics   • Smoking status: Never Smoker   • Smokeless tobacco: Never Used   • Alcohol use No   • Drug use: No   • Sexual activity: Not Asked  "    Other Topics Concern   • None     Social History Narrative     Current Outpatient Prescriptions   Medication Sig Dispense Refill   • albuterol (ACCUNEB) 1.25 MG/3ML nebulizer solution Take 3 mL by nebulization Every 6 (Six) Hours As Needed for wheezing. 60 vial 12   • albuterol (VENTOLIN HFA) 108 (90 BASE) MCG/ACT inhaler Inhale 2 puffs Every 4 (Four) Hours As Needed for wheezing. 8 g 1   • Ascorbic Acid (VITAMIN C PO) Take 1 tablet by mouth Daily. Vitamin C With Sparkle Hips oral     • bumetanide (BUMEX) 2 MG tablet Take 0.5 tablets by mouth Daily. 31 tablet 0   • Cholecalciferol (VITAMIN D3 PO) Take 1 tablet by mouth Daily.     • Cyanocobalamin (VITAMIN B-12 PO) Take 1 tablet by mouth Daily.     • levothyroxine (SYNTHROID) 112 MCG tablet Take 1 tablet by mouth Daily. 30 tablet 3   • Magnesium Oxide 200 MG tablet Take 1 tablet by mouth 2 (Two) Times a Day. 60 tablet 0   • potassium chloride (K-DUR) 10 MEQ CR tablet 10 mEq 2 (Two) Times a Day.       Current Facility-Administered Medications   Medication Dose Route Frequency Provider Last Rate Last Dose   • furosemide (LASIX) injection 40 mg  40 mg Intravenous Once BEN Lin         Objective:     Vitals:    04/03/17 0905   BP: 100/64   BP Location: Left arm   Patient Position: Sitting   Cuff Size: Adult   Pulse: 69   SpO2: 95%   Weight: 122 lb 5 oz (55.5 kg)   Height: 60\" (152.4 cm)      Physical Exam   Constitutional: She is oriented to person, place, and time. She appears well-nourished. No distress.   HENT:   Head: Normocephalic and atraumatic.   Neck: JVD: 14 cm @ 45 degrees.   Cardiovascular: Normal rate and regular rhythm.  Exam reveals no gallop.    No murmur heard.  Pulmonary/Chest: Effort normal. No respiratory distress. She has no wheezes.   Abdominal: She exhibits distension.   Musculoskeletal: She exhibits edema.   Neurological: She is alert and oriented to person, place, and time.   Skin: Skin is warm. She is diaphoretic. "   Psychiatric: She has a normal mood and affect. Her behavior is normal. Judgment and thought content normal.   Vitals reviewed.    Labs:  Lab Results   Component Value Date    GLUCOSE 129 (H) 03/31/2017    CALCIUM 9.9 03/31/2017     (L) 03/31/2017    K 3.8 03/31/2017    CO2 28.0 03/31/2017    CL 96 03/31/2017    BUN 25 (H) 03/31/2017    CREATININE 1.12 (H) 03/31/2017    EGFRIFNONA 47 (L) 03/31/2017    BCR 22.3 03/31/2017    ANIONGAP 12.0 03/31/2017     Lab Results   Component Value Date    WBC 4.55 03/24/2017    HGB 12.3 03/24/2017    HCT 35.5 03/24/2017    MCV 88.3 03/24/2017     03/24/2017     No results found for: CHOL  Lab Results   Component Value Date    TRIG 144 01/29/2014     Lab Results   Component Value Date    HDL 29 (L) 01/29/2014     Lab Results   Component Value Date    LDLCALC 118 01/29/2014     No results found for: LDL  No results found for: HDLLDLRATIO  No components found for: CHOLHDL  Lab Results   Component Value Date    TSH 8.740 (H) 03/24/2017     The following portions of the patient's history were reviewed and updated as appropriate: allergies, current medications, past family history, past medical history, past social history, past surgical history and problem list.     Assessment:      Diagnosis Plan   1. Acute systolic heart failure  furosemide (LASIX) injection 40 mg    Basic Metabolic Panel    Magnesium       Ms Landis presents with new diagnosis of HFrEF for which there is clinical evidence of hypervolemia.     Plan:     We will proceed with OP diuresis with planned close follow up. Further recommendations will be based upon follow up findings/response.     Metolazone 2.5 mg po given @ 10:05 AM.    Add: Spironolactone 12.5 mg daily@ noon, beginning today.    Labs as noted above for follow up on 04/04/2017.     Additional GMT will be based upon follow up visits/evaluation.    Recommended daily weight monitoring.  Recommended restricted dietary sodium intake.  Discussed  patient action plan for heart failure;  Recommended avoiding NSAIDs use.  Discussed warning signs requiring additional medical attention for heart failure.    Additional educational will be provided as we go forward as this is a great deal to comprehend in one visit.     Ms Landis has been scheduled for ischemic work up 04/06/2017 with Dr Meadows follow up 04/07/2017.

## 2017-04-04 NOTE — PROGRESS NOTES
Subjective:     Congestive Heart Failure (chief complaint)    Congestive Heart Failure   Presents for follow-up visit. Associated symptoms include edema (slight improvment from yesterday) and fatigue. Pertinent negatives include no abdominal pain, chest pain, chest pressure, claudication, muscle weakness, near-syncope, nocturia, orthopnea, palpitations, paroxysmal nocturnal dyspnea or unexpected weight change. Shortness of breath: NYHA Class III. Compliance with total regimen is 51-75%. Compliance problems include adherence to exercise.  Compliance with diet is %. Compliance with exercise is 0-25%. Compliance with medications is %.     Review of Systems   Constitution: Positive for fatigue, weakness and malaise/fatigue. Negative for chills, decreased appetite, fever, unexpected weight change, weight gain (34-40# since October 2016) and weight loss.   HENT: Negative for congestion.    Cardiovascular: Positive for leg swelling (slight improvment from yesterday). Negative for chest pain, claudication, irregular heartbeat, near-syncope, orthopnea, palpitations, paroxysmal nocturnal dyspnea and syncope. Dyspnea on exertion: NYHA Class III.   Respiratory: Negative for snoring. Shortness of breath: NYHA Class III.    Hematologic/Lymphatic: Does not bruise/bleed easily.   Musculoskeletal: Negative for muscle weakness.   Gastrointestinal: Negative for bloating and abdominal pain.   Genitourinary: Negative for nocturia.   Neurological: Negative for dizziness, light-headedness and vertigo.     Past Medical History:   Diagnosis Date   • Abnormal mammogram, unspecified    • Benign hypertension    • CHF (congestive heart failure)    • Fibroadenoma of breast    • Fibrocystic disease of breast     History of, bilateral, extensive     • History of bone density study 03/07/2014    Osteoporosis   • History of diagnostic mammography 05/07/2014    [place] [address] [facility] (Abnormal mammogram,  unspecified) , Reason: 6WK DX LEFT MAMMOGRAM AND ULTRASOUND   • History of echocardiogram 01/30/2014    Echocardiogram W/ color flow 21494 (Normal LV systolic function with EF of approx. 60%. Early diastolic dysfunction. Mild aortic and tricuspid regurg. Normal RV size and function. No intracardiac mass, pericardial effusion or cardiac thrombus seen.)   • History of mammogram 02/07/2014    BI-RADS Category 0   • Hypercalcemia    • Hyperparathyroidism     parathyroidectomy 3-16-15      • Hyperproteinemia    • Hyperthyroidism    • Mammogram abnormal     LEFT side with 2 areas of microcalcification    • Osteoporosis    • Postoperative hypothyroidism    • Sensorineural hearing loss, bilateral    • Thyroid nodule     left cold- benign- left thyroidectomy    • Toxic multinodular goiter     right hot----right thyroidectomy   • Vitamin D deficiency      Past Surgical History:   Procedure Laterality Date   • BREAST BIOPSY  03/21/2014    Biopsy of the left breast in 2 areas using stereotactic guidance and a rotating biopsy device with clip placement.   • COLONOSCOPY  03/06/2014    normal   • INJECTION OF MEDICATION  01/10/2014    Kenalog (1)      • LIPOMA EXCISION  03/27/1997    Biopsy, abdominal mass (Mass, abdominal wall, probably a large lipoma. Excision of lipoma.)   • MAMMO DIAGNOSTIC UNILATERAL  02/26/2014    DIAG MAMM, UNILATERAL DIGITAL  (Medicare) (Abnormal mammogram, unspecified) , Reason: LT BREAST   • THYROIDECTOMY  03/16/2015    Thyroid nodules and hyperparathyroidism. Total thyroidectomy and parathyroidectomy.     Social History     Social History   • Marital status:      Spouse name: N/A   • Number of children: N/A   • Years of education: N/A     Social History Main Topics   • Smoking status: Never Smoker   • Smokeless tobacco: Never Used   • Alcohol use No   • Drug use: No   • Sexual activity: Not Asked     Other Topics Concern   • None     Social History Narrative     Current Outpatient  "Prescriptions   Medication Sig Dispense Refill   • albuterol (ACCUNEB) 1.25 MG/3ML nebulizer solution Take 3 mL by nebulization Every 6 (Six) Hours As Needed for wheezing. 60 vial 12   • albuterol (VENTOLIN HFA) 108 (90 BASE) MCG/ACT inhaler Inhale 2 puffs Every 4 (Four) Hours As Needed for wheezing. 8 g 1   • Ascorbic Acid (VITAMIN C PO) Take 1 tablet by mouth Daily. Vitamin C With Sparkle Hips oral     • bumetanide (BUMEX) 2 MG tablet Take 0.5 tablets by mouth Daily. 31 tablet 0   • Cholecalciferol (VITAMIN D3 PO) Take 1 tablet by mouth Daily.     • Cyanocobalamin (VITAMIN B-12 PO) Take 1 tablet by mouth Daily.     • levothyroxine (SYNTHROID) 112 MCG tablet Take 1 tablet by mouth Daily. 30 tablet 3   • Magnesium Oxide 200 MG tablet Take 1 tablet by mouth 2 (Two) Times a Day. 60 tablet 0   • spironolactone (ALDACTONE) 25 MG tablet 1/2 tablet by mouth daily @ noon 15 tablet 2     No current facility-administered medications for this visit.      Objective:     Vitals:    04/04/17 0952   BP: 100/62   BP Location: Left arm   Patient Position: Sitting   Cuff Size: Adult   Pulse: 75   SpO2: 96%   Weight: 120 lb 2 oz (54.5 kg)   Height: 60\" (152.4 cm)      Physical Exam   Constitutional: She is oriented to person, place, and time. She appears well-developed and well-nourished. No distress.   HENT:   Head: Normocephalic and atraumatic.   Neck: JVD: 14 cm @ 45 degrees.   Cardiovascular: Normal rate, regular rhythm and normal heart sounds.    No murmur heard.  Pulmonary/Chest: Effort normal and breath sounds normal. No respiratory distress. She has no wheezes.   Abdominal: Distention: improved from yesterday.   Musculoskeletal: She exhibits edema (slight improvement from yesterday).   Neurological: She is alert and oriented to person, place, and time.   Skin: Skin is warm and dry. She is not diaphoretic.   Psychiatric: She has a normal mood and affect. Her behavior is normal. Judgment and thought content normal.   Vitals " reviewed.    Cardiographics  Echocardiogram: 03/17/2017  · Left Ventricle: Calculated EF = 33%. Estimated EF was in agreement with the calculated EF.  · Global left ventricular wall motion appears abnormal  · Left ventricular wall thickness is consistent with severe concentric hypertrophy  · Left ventricular diastolic dysfunction is noted (grade II w/high LAP) consistent with pseudonormalization. Elevated left atrial pressure.  · Right Ventricle: Normal cavity size and septal motion noted. Mildly reduced systolic function.  · The inferior vena cava is dilated. IVC inspiratory collapse is absent.  · Moderate aortic valve regurgitation is present.  · Moderate mitral valve regurgitation is present.  · Severe tricuspid valve regurgitation is present.  · Moderate to severe pulmonary hypertension is present.  · There is a small (<1cm) circumferential pericardial effusion  · There is a moderate size left pleural effusion.  ·   Duplex Venous BLE: 04/03/2017  · No DVT noted on bilateral lower extremity venous duplex scan.  · Pulsatile flow noted.  · A possible baker's cyst is noted in the right popliteal fossa measuring 2cm.    SAUD: 04/03/2017    Interpretation Summary   · Right Conclusion: The right SAUD is normal.  · Left Conclusion: The left SAUD is normal.       Study Impression   Right Conclusion: The right SAUD is normal.     • Right Posterior Tibial: triphasic arterial flow noted.   • Right Dorsalis Pedis: triphasic arterial flow noted.     Left Conclusion: The left SAUD is normal.     • Left Posterior Tibial: triphasic arterial flow noted.   • Left Dorsalis Pedis: triphasic arterial flow noted.       Lower Arterial Doppler Pressures    Right Pressure Left Pressure   Brachial 98 mmHg       95 mmHg         High Thigh           Low Thigh           Popliteal            mmHg       110 mmHg          mmHg       112 mmHg         Peroneal           Great Toe           2nd Digit           3rd Digit           4th Digit            5th Digit              Lower Arterial Doppler Ratios    Right Ratio Left Ratio   Calf           SAUD 1.14        1.14          TBI           Post Ex SAUD                Labs:   Lab Results   Component Value Date    GLUCOSE 129 (H) 03/31/2017    CALCIUM 9.9 03/31/2017     (L) 03/31/2017    K 3.8 03/31/2017    CO2 28.0 03/31/2017    CL 96 03/31/2017    BUN 25 (H) 03/31/2017    CREATININE 1.12 (H) 03/31/2017    EGFRIFNONA 47 (L) 03/31/2017    BCR 22.3 03/31/2017    ANIONGAP 12.0 03/31/2017     Lab Results   Component Value Date    WBC 4.55 03/24/2017    HGB 12.3 03/24/2017    HCT 35.5 03/24/2017    MCV 88.3 03/24/2017     03/24/2017     No results found for: CHOL  Lab Results   Component Value Date    TRIG 144 01/29/2014     Lab Results   Component Value Date    HDL 29 (L) 01/29/2014     Lab Results   Component Value Date    LDLCALC 118 01/29/2014     No results found for: LDL  No results found for: HDLLDLRATIO  No components found for: CHOLHDL  Lab Results   Component Value Date    TSH 8.740 (H) 03/24/2017     The following portions of the patient's history were reviewed and updated as appropriate: allergies, current medications, past family history, past medical history, past social history, past surgical history and problem list.     Assessment:      Diagnosis Plan   1. Acute systolic heart failure  furosemide (LASIX) injection 40 mg    Basic Metabolic Panel     Ms Landis presents with new diagnosis of HFrEF for which there is clinical evidence of hypervolemia.     Plan:   We will proceed with OP diuresis with planned close follow up. Further recommendations will be based upon follow up findings/response.      Metolazone 2.5 mg po given @ 10:14 AM.     Add: Spironolactone 12.5 mg daily@ noon, beginning today.     Labs as noted above for follow up on 04/04/2017.      Additional GMT will be based upon follow up visits/evaluation.     Recommended daily weight monitoring.  Recommended restricted  dietary sodium intake.  Discussed patient action plan for heart failure;  Recommended avoiding NSAIDs use.  Discussed warning signs requiring additional medical attention for heart failure.     Ms Landis has been scheduled for ischemic work up 04/06/2017 with Dr Meadows follow up 04/07/2017.

## 2017-04-05 NOTE — PROGRESS NOTES
Subjective:     Congestive Heart Failure (chief complaint)    Congestive Heart Failure   Presents for follow-up visit. Associated symptoms include edema (slight improvment from yesterday) and fatigue. Pertinent negatives include no abdominal pain, chest pain, chest pressure, claudication, muscle weakness, near-syncope, nocturia, orthopnea, palpitations, paroxysmal nocturnal dyspnea or unexpected weight change. Shortness of breath: NYHA Class III. The symptoms have been improving. Compliance with total regimen is 51-75%. Compliance problems include adherence to exercise.  Compliance with diet is %. Compliance with exercise is 0-25%. Compliance with medications is %.     Review of Systems   Constitution: Positive for fatigue, weakness and malaise/fatigue. Negative for chills, decreased appetite, fever, unexpected weight change, weight gain (34-40# since October 2016) and weight loss.   HENT: Negative for congestion.    Cardiovascular: Positive for leg swelling (slight improvment from yesterday). Negative for chest pain, claudication, irregular heartbeat, near-syncope, orthopnea, palpitations, paroxysmal nocturnal dyspnea and syncope. Dyspnea on exertion: NYHA Class III.   Respiratory: Negative for snoring. Shortness of breath: NYHA Class III.    Hematologic/Lymphatic: Does not bruise/bleed easily.   Musculoskeletal: Negative for muscle weakness.   Gastrointestinal: Negative for bloating and abdominal pain.   Genitourinary: Negative for nocturia.   Neurological: Negative for dizziness, light-headedness and vertigo.     Past Medical History:   Diagnosis Date   • Abnormal mammogram, unspecified    • Benign hypertension    • CHF (congestive heart failure)    • Fibroadenoma of breast    • Fibrocystic disease of breast     History of, bilateral, extensive     • History of bone density study 03/07/2014    Osteoporosis   • History of diagnostic mammography 05/07/2014    [place] [address] WOMEN  CTR (Abnormal mammogram, unspecified) , Reason: 6WK DX LEFT MAMMOGRAM AND ULTRASOUND   • History of echocardiogram 01/30/2014    Echocardiogram W/ color flow 64189 (Normal LV systolic function with EF of approx. 60%. Early diastolic dysfunction. Mild aortic and tricuspid regurg. Normal RV size and function. No intracardiac mass, pericardial effusion or cardiac thrombus seen.)   • History of mammogram 02/07/2014    BI-RADS Category 0   • Hypercalcemia    • Hyperparathyroidism     parathyroidectomy 3-16-15      • Hyperproteinemia    • Hyperthyroidism    • Mammogram abnormal     LEFT side with 2 areas of microcalcification    • Osteoporosis    • Postoperative hypothyroidism    • Sensorineural hearing loss, bilateral    • Thyroid nodule     left cold- benign- left thyroidectomy    • Toxic multinodular goiter     right hot----right thyroidectomy   • Vitamin D deficiency      Past Surgical History:   Procedure Laterality Date   • BREAST BIOPSY  03/21/2014    Biopsy of the left breast in 2 areas using stereotactic guidance and a rotating biopsy device with clip placement.   • COLONOSCOPY  03/06/2014    normal   • INJECTION OF MEDICATION  01/10/2014    Kenalog (1)      • LIPOMA EXCISION  03/27/1997    Biopsy, abdominal mass (Mass, abdominal wall, probably a large lipoma. Excision of lipoma.)   • MAMMO DIAGNOSTIC UNILATERAL  02/26/2014    DIAG MAMM, UNILATERAL DIGITAL  (Medicare) (Abnormal mammogram, unspecified) , Reason: LT BREAST   • THYROIDECTOMY  03/16/2015    Thyroid nodules and hyperparathyroidism. Total thyroidectomy and parathyroidectomy.     Social History     Social History   • Marital status:      Spouse name: N/A   • Number of children: N/A   • Years of education: N/A     Social History Main Topics   • Smoking status: Never Smoker   • Smokeless tobacco: Never Used   • Alcohol use No   • Drug use: No   • Sexual activity: Not Asked     Other Topics Concern   • None     Social History Narrative  "    Current Outpatient Prescriptions   Medication Sig Dispense Refill   • albuterol (ACCUNEB) 1.25 MG/3ML nebulizer solution Take 3 mL by nebulization Every 6 (Six) Hours As Needed for wheezing. 60 vial 12   • albuterol (VENTOLIN HFA) 108 (90 BASE) MCG/ACT inhaler Inhale 2 puffs Every 4 (Four) Hours As Needed for wheezing. 8 g 1   • Ascorbic Acid (VITAMIN C PO) Take 1 tablet by mouth Daily. Vitamin C With Sparkle Hips oral     • bumetanide (BUMEX) 2 MG tablet Take 0.5 tablets by mouth Daily. 31 tablet 0   • Cholecalciferol (VITAMIN D3 PO) Take 1 tablet by mouth Daily.     • Cyanocobalamin (VITAMIN B-12 PO) Take 1 tablet by mouth Daily.     • levothyroxine (SYNTHROID) 112 MCG tablet Take 1 tablet by mouth Daily. 30 tablet 3   • Magnesium Oxide 200 MG tablet Take 1 tablet by mouth 2 (Two) Times a Day. 60 tablet 0   • spironolactone (ALDACTONE) 25 MG tablet 1/2 tablet by mouth daily @ noon 15 tablet 2     No current facility-administered medications for this visit.      Objective:     Vitals:    04/05/17 0922   BP: 100/64   BP Location: Left arm   Patient Position: Sitting   Cuff Size: Adult   Pulse: 75   SpO2: 95%   Weight: 114 lb 9 oz (52 kg)   Height: 60\" (152.4 cm)      Physical Exam  Cardiographics  Echocardiogram: 03/17/2017  · Left Ventricle: Calculated EF = 33%. Estimated EF was in agreement with the calculated EF.  · Global left ventricular wall motion appears abnormal  · Left ventricular wall thickness is consistent with severe concentric hypertrophy  · Left ventricular diastolic dysfunction is noted (grade II w/high LAP) consistent with pseudonormalization. Elevated left atrial pressure.  · Right Ventricle: Normal cavity size and septal motion noted. Mildly reduced systolic function.  · The inferior vena cava is dilated. IVC inspiratory collapse is absent.  · Moderate aortic valve regurgitation is present.  · Moderate mitral valve regurgitation is present.  · Severe tricuspid valve regurgitation is " present.  · Moderate to severe pulmonary hypertension is present.  · There is a small (<1cm) circumferential pericardial effusion  · There is a moderate size left pleural effusion.  ·   Duplex Venous BLE: 04/03/2017  · No DVT noted on bilateral lower extremity venous duplex scan.  · Pulsatile flow noted.  · A possible baker's cyst is noted in the right popliteal fossa measuring 2cm.    SAUD: 04/03/2017    Interpretation Summary   · Right Conclusion: The right SAUD is normal.  · Left Conclusion: The left SAUD is normal.       Study Impression   Right Conclusion: The right SAUD is normal.     • Right Posterior Tibial: triphasic arterial flow noted.   • Right Dorsalis Pedis: triphasic arterial flow noted.     Left Conclusion: The left SAUD is normal.     • Left Posterior Tibial: triphasic arterial flow noted.   • Left Dorsalis Pedis: triphasic arterial flow noted.       Lower Arterial Doppler Pressures    Right Pressure Left Pressure   Brachial 98 mmHg       95 mmHg         High Thigh           Low Thigh           Popliteal            mmHg       110 mmHg          mmHg       112 mmHg         Peroneal           Great Toe           2nd Digit           3rd Digit           4th Digit           5th Digit              Lower Arterial Doppler Ratios    Right Ratio Left Ratio   Calf           SAUD 1.14        1.14          TBI           Post Ex SAUD                Labs:   Lab Results   Component Value Date    GLUCOSE 77 04/04/2017    CALCIUM 9.5 04/04/2017     (L) 04/04/2017    K 3.5 04/04/2017    CO2 29.0 04/04/2017    CL 97 04/04/2017    BUN 28 (H) 04/04/2017    CREATININE 1.24 (H) 04/04/2017    EGFRIFNONA 42 (L) 04/04/2017    BCR 22.6 04/04/2017    ANIONGAP 10.0 04/04/2017     Lab Results   Component Value Date    WBC 4.55 03/24/2017    HGB 12.3 03/24/2017    HCT 35.5 03/24/2017    MCV 88.3 03/24/2017     03/24/2017     No results found for: CHOL  Lab Results   Component Value Date    TRIG 144 01/29/2014      Lab Results   Component Value Date    HDL 29 (L) 01/29/2014     Lab Results   Component Value Date    LDLCALC 118 01/29/2014     No results found for: LDL  No results found for: HDLLDLRATIO  No components found for: CHOLHDL  Lab Results   Component Value Date    TSH 8.740 (H) 03/24/2017     The following portions of the patient's history were reviewed and updated as appropriate: allergies, current medications, past family history, past medical history, past social history, past surgical history and problem list.     Assessment:      Diagnosis Plan   1. Acute systolic heart failure  furosemide (LASIX) injection 40 mg       Ms Landis presents with new diagnosis of HFrEF for which there is clinical evidence of hypervolemia.     Plan:   We will proceed with OP diuresis with planned close follow up. Further recommendations will be based upon follow up findings/response.      Will contact with today's lab results.     Additional GMT will be based upon follow up visits/evaluation.     Recommended daily weight monitoring.  Recommended restricted dietary sodium intake.  Discussed patient action plan for heart failure;  Recommended avoiding NSAIDs use.  Discussed warning signs requiring additional medical attention for heart failure.     Ms Landis has been scheduled for ischemic work up 04/06/2017 with Dr Meadows follow up 04/07/2017.

## 2017-04-07 NOTE — PROGRESS NOTES
Cardiovascular Medicine   Emeterio Meadows M.D., Ph.D., [facility]      Thank you for asking me to see Lissette Landis for Dyspnea concerning for congestive heart failure.    History of Present Illness  This is a 76 y.o. female with:    1. NICM  A. LVEF-33%, non-dilated  2. LE edema  3. PH  4. Severe TR  5. Moderate AI  6. Moderate MR  7. Pericardial effusion    NICM  The patient returns for her dyspnea.  Patient started having dyspnea in December 2016.  She was diagnosed with hypothyroidism and placed on thyroid replacement hormone therapy.  She tells me that she was having increasing fatigue and dyspnea on exertion.  She also had increasing lower extremity edema.  This difficult for her to place her socks or shoes on at that time.  She saw her PCP who thought that this was side effects from her thyroid replacement therapy.  This apparently was discontinued.  On physical exam there was concerns for wheezing.  Chest x-ray showed bilateral pleural effusions.  No overt infiltrates.  She was then placed on antibiotics and presumably treated for CAP.  There is no echocardiogram or BNP obtained.  She was also prescribed albuterol with minimal effect.  Chest x-ray was repeated and continued to show bilateral pleural effusions.  She was then prescribed as needed Lasix.  She recently saw Dr. Baires in pulmonary who thought her presentation was more consistent with congestive heart failure.      Since her last visit, her left ventricular ejection fraction returned at 33%.  Left ventricular chamber dimensions were normal.  She has had marginal perfusion, so she's only been taking Bumex which is currently 1 mg a day and Aldactone 12.5 mg.  She's had numerous visits to our heart failure clinic where she is received subcutaneous furosemide with good results.    Mitral regurgitation and aortic regurgitation  Her most recent echocardiogram showed moderate MR and AR.  Left ventricle ejection fraction was 33%.  Chamber dimensions  were normal.    Review of Systems - History obtained from chart review and the patient  General ROS: positive for  - fatigue  Cardiovascular ROS: positive for - dyspnea on exertion and edema.  All other systems were reviewed and were negative.    family history includes Cancer in her other; Lupus in her father.     reports that she has never smoked. She has never used smokeless tobacco. She reports that she does not drink alcohol or use illicit drugs.    Allergies   Allergen Reactions   • Sulfa Antibiotics GI Intolerance         Current Outpatient Prescriptions:   •  albuterol (ACCUNEB) 1.25 MG/3ML nebulizer solution, Take 3 mL by nebulization Every 6 (Six) Hours As Needed for wheezing., Disp: 60 vial, Rfl: 12  •  albuterol (VENTOLIN HFA) 108 (90 BASE) MCG/ACT inhaler, Inhale 2 puffs Every 4 (Four) Hours As Needed for wheezing., Disp: 8 g, Rfl: 1  •  Ascorbic Acid (VITAMIN C PO), Take 1 tablet by mouth Daily. Vitamin C With Sparkle Hips oral, Disp: , Rfl:   •  bumetanide (BUMEX) 2 MG tablet, Take 0.5 tablets by mouth Daily., Disp: 31 tablet, Rfl: 0  •  Cholecalciferol (VITAMIN D3 PO), Take 1 tablet by mouth Daily., Disp: , Rfl:   •  Cyanocobalamin (VITAMIN B-12 PO), Take 1 tablet by mouth Daily., Disp: , Rfl:   •  levothyroxine (SYNTHROID) 112 MCG tablet, Take 1 tablet by mouth Daily., Disp: 30 tablet, Rfl: 3  •  Magnesium Oxide 200 MG tablet, Take 1 tablet by mouth 2 (Two) Times a Day., Disp: 60 tablet, Rfl: 0  •  spironolactone (ALDACTONE) 25 MG tablet, 1/2 tablet by mouth daily @ noon, Disp: 15 tablet, Rfl: 2  No current facility-administered medications for this visit.     Physical Exam:  Vitals:    04/07/17 1057   BP: 98/58   Pulse: 85   SpO2: 98%     Body mass index is 21.87 kg/(m^2).    GEN: alert, appears stated age and cooperative  Body Habitus: well-nourished  Neuro: CN II-XII grossly intact.   HEENT: Head: Normocephalic, no lesions, without obvious abnormality.  Neck / Thyroid: Supple, no masses, nodes,  nodules or enlargement. No arcus senilis, xanthelasma or xanthomas. PERRL. Normal external ears. No drainage. No thyromegaly. Neck supple. No LAD. Trachea midline. Nose, normal.  JVP: 14 cm of water at 45 degrees HJR: absent      Carotid:  Upstroke: easily palpated bilaterally Volume: Normal.    Carotid Bruit:  None  Subclavian Bruit: Not present.    Lymph: No overt LAD.   Back: Normal.  Chest:  Normal Excursion: Good    I:E: Good  Pulmonary:clear to auscultation, no wheezes, rales or rhonchi, symmetric air entry. Equal chest excursion. Chest physical exam is normal. No tenderness.        Precordium:  No palpable heaves or thrusts. P2 is not palpable.   Glen:  normal size and placement Palpable S4: Not present.   Heart rate: normal  Heart Rhythm: regular     Heart Sounds: S1: normal intensity  S2: increased intensity  S3: absent   S4: absent  Opening Snap: absent  A2-OS:  N/A  Pericardial rub: absent    Ejection click: None      Murmurs: Systolic: none  Diastolic: none  Abdomen: Soft, non-tender, normal bowel sounds; no bruits, organomegaly or masses.  Extremity: 1+ lower extremity edema bilaterally  Pulses: Right radial artery has 2+ (normal) pulse and Left radial artery has 2+ (normal) pulse    DATA REVIEWED:     Pulmonary notes from Dr. Baires were reviewed showing concern for congestive failure.  She was referred to my clinic.  Echocardiogram was ordered.    FINDINGS: Two views of the chest reveal small to moderate  bilateral pleural effusions with areas of atelectasis in both  lung bases. The appearance is quite similar to the prior study.  The heart is moderately enlarged. The pulmonary vessels are  unremarkable. The lungs are otherwise clear. There is no  pneumothorax. Previously noted right upper lobe infiltrate is no  longer seen.     IMPRESSION:  Stable mild to moderate bilateral pleural effusions  with bibasilar atelectasis. Cardiomegaly. Resolution of  previously noted right upper lobe infiltrate.         Ref Range & Units 2wk ago     Magnesium 1.6 - 2.3 mg/dL 1.5 (L)   Resulting Agency   [facility]   · Nuclear Study Description: A 1-day rest/stress protocol myocardial perfusion imaging study was performed.  · Nuclear Perfusion Images: Overall image quality is excellent  · Stress Description: A pharmacological stress test was performed using regadenoson without low-level exercise.  · Stress Findings: No ECG evidence of myocardial ischemia  · Study Impression: Myocardial perfusion imaging indicates a normal myocardial perfusion study with no evidence of ischemia.  · Impressions are consistent with a low risk study.      · No DVT noted on bilateral lower extremity venous duplex scan.  · Pulsatile flow noted.  · A possible baker's cyst is noted in the right popliteal fossa measuring 2cm.    · Left Ventricle: Calculated EF = 33%. Estimated EF was in agreement with the calculated EF.  · Global left ventricular wall motion appears abnormal  · Left ventricular wall thickness is consistent with severe concentric hypertrophy  · Left ventricular diastolic dysfunction is noted (grade II w/high LAP) consistent with pseudonormalization. Elevated left atrial pressure.  · Right Ventricle: Normal cavity size and septal motion noted. Mildly reduced systolic function.  · The inferior vena cava is dilated. IVC inspiratory collapse is absent.  · Moderate aortic valve regurgitation is present.  · Moderate mitral valve regurgitation is present.  · Severe tricuspid valve regurgitation is present.  · Moderate to severe pulmonary hypertension is present.  · There is a small (<1cm) circumferential pericardial effusion  · There is a moderate size left pleural effusion.  Assessment/Plan      1.  NICM (EF:33%). NYHA stage C; FC-NYHA Class III.  NYHA change since last visit: did not change. Patient is currently volume overloaded. and marginally perfused physiologic state. ADHF admissions in past 6 months: 1.   HDs are acceptable. HR: normal. BP:  marginal.  There is a marked discrepancy between her myocardial perfusion stress and her left ventricular ejection fraction on transthoracic echocardiogram.  BETA-BLOCKER: We will not be able to initiate at this time due to her marginal hemodynamics  ACE/ARB/ENTRESTO: We will not be able to initiate at this time due to marginal hemodynamics  DIURETIC: Bumex to 1 mg by mouth once a day  MRA: The patient is FC-NYHA Class III and MRA is indicated.   -Spironolactone 12.5 mg mg  -CMP every three months  Fluid restriction: Other 2000 mL  Sodium restriction: 2,000 mg Na  ICD: Possibly indicated.  Will reassess left ventricular ejection fraction in 90 days   CardioMEMS:Indicated  AHF: Possibly indicated  LVAD: Possibly indicated  6MWT: yes  CPeX: not indicated  -Action plan: Pt. has an action plan provided on paper for self-titration of diuretic dose at home.  -MUGA for LVEF    2.  Moderate mitral regurgitation and moderate aortic regurgitation.  -Echocardiogram in 12 months    3. PAH/PVH. WHO Group 2.1; FC: Class 3 - comfortable at rest but have symptoms with less-than-ordinary effort..    RV status: Adapted. The patient is in an markedly hypervolemix  and marginally perfused physiologic state.   -6MWT  -Repeat PAH-protocol TTE in 3 months    4. Tobacco status: non-smoker     Plan for follow-up: 1 month      EMR Dragon/Transcription disclaimer:     Much of this encounter note is an electronic transcription. This may permit erroneous, or at times, nonsensical words or phrases to be inadvertently transcribed; Although I have reviewed the note for such errors, some may still exist.

## 2017-04-07 NOTE — PATIENT INSTRUCTIONS
MUGA Scan  A MUGA scan (multigated acquisition scan) is a test that makes pictures of your heart at specific times while it beats. The test uses a radioactive dye (tracer) that is injected into your bloodstream. The dye makes it possible for your health care provider to see the red blood cells passing through your heart.  A MUGA scan shows:  · How much blood your heart is pumping out with each heartbeat.  · How well your ventricles are working. Ventricles are chambers in the lower part of your heart. They pump blood to your lungs and to the rest of your body.  A MUGA scan may be done to determine:  · What is causing heart symptoms, such as chest pain.  · If the arteries that supply your heart are blocked.  · If you have heart valve disease.  · If your heart is damaged from a heart attack.  · If your heart has trouble pumping blood.  LET YOUR HEALTH CARE PROVIDER KNOW ABOUT:  · Any allergies you have.  · All medicines you are taking, including vitamins, herbs, eye drops, creams, and over-the-counter medicines.  · Any blood disorders you have.  · Previous surgeries you have had.  · Medical conditions you have.  · If you are pregnant, if you might be pregnant, or if you are breastfeeding. This is very important.  RISKS AND COMPLICATIONS  Generally this is a safe procedure. However, problems can occur and include:  · An allergic reaction to the tracer. This side effect is rare.  · Pain and redness at the IV site.  · Potential harm to your baby if you are pregnant or breastfeeding.  BEFORE THE PROCEDURE  · Do not take your regular medicines before your procedure if your health care provider asks you not to. Ask your health care provider about changing or stopping those medicines.  · Do not drink any beverages that have caffeine for several hours before the scan. Beverages containing caffeine include coffee, tea, and soft drinks.  · Ask your health care provider if you will be having an exercise scan with your MUGA  scan.  · If you will be having an exercise scan with your MUGA scan, do not eat or drink anything except water for 4 hours before the scan.  · Wear loose, comfortable clothing.  PROCEDURE  · You will be asked to lie down on an exam table.  · An IV tube will be put into one of your veins. Medicine will flow through the tube during the procedure.  · Discs called electrodes will be attached to your chest, arms, and legs. These will be connected with wires to a machine.  · A small amount of tracer will be injected through your IV. You may feel a cold sensation in your arm as it runs through your IV.  · A camera will be placed over your chest. It will take a series of pictures while you rest.  · If you need to have an exercise scan, you will walk on a treadmill or ride a stationary bicycle. Then you will be asked to lie back down on the exam table for more pictures.  · After all of the pictures have been taken, your IV tube will be removed.  AFTER THE PROCEDURE  · Drink enough fluid to keep your urine clear or pale yellow. This helps to flush the tracer out of your body.  · It is your responsibility to get your test results. Ask the lab or department performing the test when and how you will get your results.  · Keep all follow-up visits as directed by your health care provider. This is important.     This information is not intended to replace advice given to you by your health care provider. Make sure you discuss any questions you have with your health care provider.     Document Released: 02/06/2007 Document Revised: 01/08/2016 Document Reviewed: 05/07/2015  [employer] Patient Education ©2016 [employer]

## 2017-04-12 NOTE — PROGRESS NOTES
Subjective    Lissette Landis is a 76 y.o. female. she is here today for follow-up.    Thyroid Problem   Patient reports no cold intolerance, constipation, diaphoresis, diarrhea, fatigue, heat intolerance, palpitations or tremors.            76 year old female presents for follow up      reason - total thyroidectomy and right parathyroidectomy     duration - March 2016      Timing constant      Quality not controlled      Severity moderate      alleviating factors compliance with levothyroxine      Symptoms - fatigue        Previously seen for hyperthyroidism, hypercalcemia                                   Highest calcium was 11.3      Severity high due to osteoporosis      Now controlled     Since last appt she has been dx w nonischemic cmp and ckd stage III, following w [facility] and [facility]          Evaluation history:  TSH   Date Value Ref Range Status   04/21/2017 3.980 0.460 - 4.680 mIU/mL Final     Free T4   Date Value Ref Range Status   04/21/2017 3.27 (H) 0.78 - 2.19 ng/dL Final       Current medications:  No current facility-administered medications for this visit.      No current outpatient prescriptions on file.     Facility-Administered Medications Ordered in Other Visits   Medication Dose Route Frequency Provider Last Rate Last Dose   • albuterol (PROVENTIL) nebulizer solution 0.083% 2.5 mg/3mL  1.25 mg Nebulization Q4H PRN Janis Singh MD   1.25 mg at 04/22/17 1847   • amiodarone in dextrose 5% (NEXTERONE) loading dose 150mg/100mL  150 mg Intravenous Once Aroldo Scott MD   Stopped at 04/22/17 1635   • doxycycline (VIBRAMYCIN) 100 mg/100 mL 0.9% NS MBP  100 mg Intravenous Q12H Janis Singh MD 0 mL/hr at 04/21/17 2300 100 mg at 04/22/17 2031   • heparin (porcine) 5000 UNIT/ML injection 5,000 Units  5,000 Units Subcutaneous Q12H Janis Singh MD   5,000 Units at 04/22/17 2032   • levothyroxine (SYNTHROID, LEVOTHROID) tablet 125 mcg  125 mcg Oral Q AM Janis Singh MD    125 mcg at 04/22/17 0538   • Morphine sulfate (PF) injection 1 mg  1 mg Intravenous Q4H PRN Janis Singh MD       • naloxone (NARCAN) injection 0.4 mg  0.4 mg Intravenous Q5 Min PRN Janis Singh MD       • phenylephrine (HUSSEIN-SYNEPHRINE) 50 mg in sodium chloride 0.9 % 250 mL infusion  0.5-3 mcg/kg/min Intravenous Titrated BEN Lin       • piperacillin-tazobactam (ZOSYN) in iso-osmotic dextrose IVPB 2.25 g (premix)  2.25 g Intravenous Q6H Janis Singh MD 0 mL/hr at 04/21/17 2230 2.25 g at 04/22/17 2032   • sodium chloride 0.9 % bolus 250 mL  250 mL Intravenous Once Janis Singh MD       • sodium chloride 0.9 % flush 1-10 mL  1-10 mL Intravenous PRN Janis Singh MD   10 mL at 04/22/17 1506   • sodium chloride 0.9 % flush 10 mL  10 mL Intravenous PRN Vinnie Soliman MD   10 mL at 04/22/17 1504   • sodium chloride 0.9 % infusion  75 mL/hr Intravenous Continuous Janis Singh MD 75 mL/hr at 04/22/17 0538 75 mL/hr at 04/22/17 0538       The following portions of the patient's history were reviewed and updated as appropriate:   Past Medical History:   Diagnosis Date   • Abnormal mammogram, unspecified    • Benign hypertension    • CHF (congestive heart failure)    • Fibroadenoma of breast    • Fibrocystic disease of breast     History of, bilateral, extensive     • History of bone density study 03/07/2014    Osteoporosis   • History of diagnostic mammography 05/07/2014    [place] [address] [facility] (Abnormal mammogram, unspecified) , Reason: 6WK DX LEFT MAMMOGRAM AND ULTRASOUND   • History of echocardiogram 01/30/2014    Echocardiogram W/ color flow 49718 (Normal LV systolic function with EF of approx. 60%. Early diastolic dysfunction. Mild aortic and tricuspid regurg. Normal RV size and function. No intracardiac mass, pericardial effusion or cardiac thrombus seen.)   • History of mammogram 02/07/2014    BI-RADS Category 0   •  Hypercalcemia    • Hyperparathyroidism     parathyroidectomy 3-16-15      • Hyperproteinemia    • Hyperthyroidism    • Kidney stone     follows with  [facility]   • Mammogram abnormal     LEFT side with 2 areas of microcalcification    • Osteoporosis    • Postoperative hypothyroidism    • Sensorineural hearing loss, bilateral    • Thyroid nodule     left cold- benign- left thyroidectomy    • Toxic multinodular goiter     right hot----right thyroidectomy   • Vitamin D deficiency      Past Surgical History:   Procedure Laterality Date   • BREAST BIOPSY  2014    Biopsy of the left breast in 2 areas using stereotactic guidance and a rotating biopsy device with clip placement.   • COLONOSCOPY  2014    normal   • EXTRACORPOREAL SHOCK WAVE LITHOTRIPSY (ESWL) Right 2011   • INJECTION OF MEDICATION  01/10/2014    Kenalog (1)      • LIPOMA EXCISION  1997    Biopsy, abdominal mass (Mass, abdominal wall, probably a large lipoma. Excision of lipoma.)   • MAMMO DIAGNOSTIC UNILATERAL  2014    DIAG MAMM, UNILATERAL DIGITAL  (Medicare) (Abnormal mammogram, unspecified) , Reason: LT BREAST   • PARATHYROIDECTOMY  2015   • THYROIDECTOMY  2015    Thyroid nodules and hyperparathyroidism. Total thyroidectomy and parathyroidectomy.   • VAGINAL DELIVERY      x 2     Family History   Problem Relation Age of Onset   • Cancer Father    • Kidney failure Father    • Cancer Other    • Lupus Mother    • Hypothyroidism Daughter    • Hypertension Daughter    • Arthritis Daughter    • Hypertension Daughter    • Breast cancer Grandchild      OB History      Para Term  AB TAB SAB Ectopic Multiple Living    2 2        2        Allergies   Allergen Reactions   • Sulfa Antibiotics GI Intolerance     Social History     Social History   • Marital status:      Spouse name: N/A   • Number of children: N/A   • Years of education: N/A     Social History Main Topics   • Smoking status: Never  Smoker   • Smokeless tobacco: Never Used   • Alcohol use No   • Drug use: No   • Sexual activity: No     Other Topics Concern   • Not on file     Social History Narrative     lives in [place].    in 2016 but was in the nursing home for 4 years previously.  She is still active mowing yards.  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  She used to own machine shop.  Been around chemicals, metals, and secondhand smoke.       Review of Systems  Review of Systems   Constitutional: Negative for activity change, appetite change, chills, diaphoresis and fatigue.   HENT: Negative for congestion, dental problem, drooling, ear discharge, ear pain, facial swelling, sneezing, sore throat, tinnitus, trouble swallowing and voice change.    Eyes: Negative for photophobia, pain, discharge, redness, itching and visual disturbance.   Respiratory: Negative for apnea, cough, choking, chest tightness and shortness of breath.    Cardiovascular: Negative for chest pain, palpitations and leg swelling.   Gastrointestinal: Negative for abdominal distention, abdominal pain, constipation, diarrhea, nausea and vomiting.   Endocrine: Negative for cold intolerance, heat intolerance, polydipsia, polyphagia and polyuria.   Genitourinary: Negative for difficulty urinating, dysuria, frequency, hematuria and urgency.   Musculoskeletal: Negative for arthralgias, back pain, gait problem, joint swelling, myalgias, neck pain and neck stiffness.   Skin: Negative for color change, pallor, rash and wound.   Allergic/Immunologic: Negative for environmental allergies, food allergies and immunocompromised state.   Neurological: Negative for dizziness, tremors, facial asymmetry, weakness, light-headedness, numbness and headaches.   Hematological: Negative for adenopathy. Does not bruise/bleed easily.   Psychiatric/Behavioral: Negative for agitation, behavioral problems, confusion, decreased concentration and sleep  disturbance.        Objective      /62 (BP Location: Left arm, Patient Position: Sitting, Cuff Size: Adult)  Pulse 70  Wt 113 lb 14.4 oz (51.7 kg)  BMI 22.24 kg/m2  Physical Exam   Constitutional: She is oriented to person, place, and time. She appears well-developed and well-nourished. No distress.   HENT:   Head: Normocephalic and atraumatic.   Right Ear: External ear normal.   Left Ear: External ear normal.   Nose: Nose normal.   Eyes: Conjunctivae and EOM are normal. Pupils are equal, round, and reactive to light.   Neck: Normal range of motion. Neck supple. No tracheal deviation present. No thyromegaly present.   Cardiovascular: Normal rate, regular rhythm and normal heart sounds.    No murmur heard.  Pulmonary/Chest: Effort normal. No respiratory distress. She has no wheezes.   Abdominal: Soft. Bowel sounds are normal. There is no tenderness. There is no rebound and no guarding.   Musculoskeletal: Normal range of motion. She exhibits no edema, tenderness or deformity.   Neurological: She is alert and oriented to person, place, and time. No cranial nerve deficit.   Skin: Skin is warm and dry. No rash noted.   Psychiatric: She has a normal mood and affect. Her behavior is normal. Judgment and thought content normal.       Lab Review  Lab Results   Component Value Date    TSH 3.980 04/21/2017     Lab Results   Component Value Date    FREET4 3.27 (H) 04/21/2017        Assessment/Plan        ICD-10-CM ICD-9-CM   1. Postoperative hypothyroidism E89.0 244.0   2. Osteoporosis M81.0 733.00   3. Prediabetes R73.03 790.29           Plan Details     Hypercalcemia - resolved after parathyroidectomy       Calcium normal         Postoperative hypothyroidism ---    presently on lt4 at 125 mcgs daily     Lab Results   Component Value Date    TSH 3.980 04/21/2017          For depression , I started paxil 10 mg daily but asked for her to establish w Dr. Jim--increase to 20 mg         Prediabetes       HgbA1c 5.8 %      No indication for medication at this time        Osteoporosis    Continue calcium + D    Order DXA next appt      She has developed comorbidities, CHF, CKD    I suggested to minimize appts w me.     Do TSH q 3 months and I will call,     We will meet once per year       4. No Follow-up on file.

## 2017-04-18 NOTE — PROGRESS NOTES
Lissette Landis  Procedure: 6 Minute Walk Test   04/18/2017  Indication:HFrEF    Pretest: BP:98/66               HR:102               Sa02:95               Dyspnea:6               Fatigue:6    Post Test: BP:104/68               HR:113               Sa02:98               Dyspnea:7               Fatigue:6    First 6MWT:yes    Supplemental oxygen during test:no    Stopped before 6 minutes:yes, 4 min 22 sec    Pauses:no    Results in distance walked:148.84 meters    Did individual experience any pain or discomfort:no    I was present for the above test and agree with the data.     NYHA Functional Class III

## 2017-04-20 PROBLEM — I34.0 NON-RHEUMATIC MITRAL REGURGITATION: Status: ACTIVE | Noted: 2017-01-01

## 2017-04-20 PROBLEM — I35.1 NONRHEUMATIC AORTIC VALVE INSUFFICIENCY: Status: ACTIVE | Noted: 2017-01-01

## 2017-04-20 PROBLEM — I27.20 PULMONARY HYPERTENSION (HCC): Status: ACTIVE | Noted: 2017-01-01

## 2017-04-20 NOTE — PROGRESS NOTES
Subjective:     Congestive Heart Failure (chief complaint)    Congestive Heart Failure   Presents for follow-up visit. Associated symptoms include edema (improved - reports that in the mornings there is significant decrease - compliant with compression stockings) and fatigue. Pertinent negatives include no abdominal pain, chest pain, chest pressure, claudication, muscle weakness, near-syncope, nocturia, orthopnea, palpitations, paroxysmal nocturnal dyspnea or unexpected weight change. Shortness of breath: NYHA Class III. The symptoms have been improving. Compliance with total regimen is 51-75%. Compliance problems include adherence to exercise.  Compliance with diet is %. Compliance with exercise is 26-50%. Compliance with medications is %.     Review of Systems   Constitution: Positive for fatigue, weakness and malaise/fatigue. Negative for chills, decreased appetite, fever, unexpected weight change, weight gain and weight loss.   HENT: Negative for congestion.    Cardiovascular: Positive for leg swelling (pedal/ankle/distal). Negative for chest pain, claudication, irregular heartbeat, near-syncope, orthopnea, palpitations, paroxysmal nocturnal dyspnea and syncope. Dyspnea on exertion: NYHA Class III.   Respiratory: Negative for snoring. Shortness of breath: NYHA Class III.    Hematologic/Lymphatic: Does not bruise/bleed easily.   Musculoskeletal: Negative for muscle weakness.   Gastrointestinal: Negative for bloating and abdominal pain.   Genitourinary: Negative for nocturia.   Neurological: Negative for dizziness, light-headedness and vertigo.     Past Medical History:   Diagnosis Date   • Abnormal mammogram, unspecified    • Benign hypertension    • CHF (congestive heart failure)    • Fibroadenoma of breast    • Fibrocystic disease of breast     History of, bilateral, extensive     • History of bone density study 03/07/2014    Osteoporosis   • History of diagnostic mammography 05/07/2014     [place] [address] [facility] (Abnormal mammogram, unspecified) , Reason: 6WK DX LEFT MAMMOGRAM AND ULTRASOUND   • History of echocardiogram 01/30/2014    Echocardiogram W/ color flow 22621 (Normal LV systolic function with EF of approx. 60%. Early diastolic dysfunction. Mild aortic and tricuspid regurg. Normal RV size and function. No intracardiac mass, pericardial effusion or cardiac thrombus seen.)   • History of mammogram 02/07/2014    BI-RADS Category 0   • Hypercalcemia    • Hyperparathyroidism     parathyroidectomy 3-16-15      • Hyperproteinemia    • Hyperthyroidism    • Mammogram abnormal     LEFT side with 2 areas of microcalcification    • Osteoporosis    • Postoperative hypothyroidism    • Sensorineural hearing loss, bilateral    • Thyroid nodule     left cold- benign- left thyroidectomy    • Toxic multinodular goiter     right hot----right thyroidectomy   • Vitamin D deficiency      Past Surgical History:   Procedure Laterality Date   • BREAST BIOPSY  03/21/2014    Biopsy of the left breast in 2 areas using stereotactic guidance and a rotating biopsy device with clip placement.   • COLONOSCOPY  03/06/2014    normal   • INJECTION OF MEDICATION  01/10/2014    Kenalog (1)      • LIPOMA EXCISION  03/27/1997    Biopsy, abdominal mass (Mass, abdominal wall, probably a large lipoma. Excision of lipoma.)   • MAMMO DIAGNOSTIC UNILATERAL  02/26/2014    DIAG MAMM, UNILATERAL DIGITAL  (Medicare) (Abnormal mammogram, unspecified) , Reason: LT BREAST   • THYROIDECTOMY  03/16/2015    Thyroid nodules and hyperparathyroidism. Total thyroidectomy and parathyroidectomy.     Social History     Social History   • Marital status:      Spouse name: N/A   • Number of children: N/A   • Years of education: N/A     Social History Main Topics   • Smoking status: Never Smoker   • Smokeless tobacco: Never Used   • Alcohol use No   • Drug use: No   • Sexual activity: Not Asked     Other Topics Concern  "  • None     Social History Narrative     Current Outpatient Prescriptions   Medication Sig Dispense Refill   • albuterol (ACCUNEB) 1.25 MG/3ML nebulizer solution Take 3 mL by nebulization Every 6 (Six) Hours As Needed for wheezing. 60 vial 12   • albuterol (VENTOLIN HFA) 108 (90 BASE) MCG/ACT inhaler Inhale 2 puffs Every 4 (Four) Hours As Needed for wheezing. 8 g 1   • Ascorbic Acid (VITAMIN C PO) Take 1 tablet by mouth Daily. Vitamin C With Sparkle Hips oral     • bumetanide (BUMEX) 2 MG tablet Take 0.5 tablets by mouth Daily. 31 tablet 0   • Cholecalciferol (VITAMIN D3 PO) Take 1 tablet by mouth Daily.     • Cyanocobalamin (VITAMIN B-12 PO) Take 1 tablet by mouth Daily.     • levothyroxine (SYNTHROID) 125 MCG tablet Take 1 tablet by mouth Daily. 30 tablet 11   • Magnesium Oxide 200 MG tablet Take 1 tablet by mouth 2 (Two) Times a Day. 60 tablet 0   • spironolactone (ALDACTONE) 25 MG tablet 1/2 tablet by mouth daily @ noon 15 tablet 2     No current facility-administered medications for this visit.      Objective:     Vitals:    04/18/17 1350   BP: 98/66   BP Location: Left arm   Patient Position: Sitting   Cuff Size: Adult   Pulse: 102   SpO2: 95%   Weight: 116 lb 5 oz (52.8 kg)   Height: 60\" (152.4 cm)      Physical Exam   Constitutional: She is oriented to person, place, and time. She appears well-nourished. No distress.   HENT:   Head: Normocephalic and atraumatic.   Neck: JVD: 10cm @ 45 degrees.   Cardiovascular: Normal rate and regular rhythm.  Exam reveals no gallop.    No murmur heard.  Pulmonary/Chest: Breath sounds normal. No respiratory distress. She has no wheezes.   Abdominal: She exhibits no distension.   Musculoskeletal: She exhibits edema (compression stockings in place - 2+ pedal/ankle/distal bilateral).   Neurological: She is alert and oriented to person, place, and time.   Skin: Skin is warm and dry. She is not diaphoretic.   Psychiatric: She has a normal mood and affect. Her behavior is normal. " Judgment and thought content normal.   Vitals reviewed.    Cardiographics  Echocardiogram: 03/17/2017  · Left Ventricle: Calculated EF = 33%. Estimated EF was in agreement with the calculated EF.  · Global left ventricular wall motion appears abnormal  · Left ventricular wall thickness is consistent with severe concentric hypertrophy  · Left ventricular diastolic dysfunction is noted (grade II w/high LAP) consistent with pseudonormalization. Elevated left atrial pressure.  · Right Ventricle: Normal cavity size and septal motion noted. Mildly reduced systolic function.  · The inferior vena cava is dilated. IVC inspiratory collapse is absent.  · Moderate aortic valve regurgitation is present.  · Moderate mitral valve regurgitation is present.  · Severe tricuspid valve regurgitation is present.  · Moderate to severe pulmonary hypertension is present.  · There is a small (<1cm) circumferential pericardial effusion  · There is a moderate size left pleural effusion.    MPS: 04/07/2017:  · Nuclear Study Description: A 1-day rest/stress protocol myocardial perfusion imaging study was performed.  · Nuclear Perfusion Images: Overall image quality is excellent  · Stress Description: A pharmacological stress test was performed using regadenoson without low-level exercise.  · Stress Findings: No ECG evidence of myocardial ischemia  · Study Impression: Myocardial perfusion imaging indicates a normal myocardial perfusion study with no evidence of ischemia.  · Impressions are consistent with a low risk study.    MUGA: 04/14/2017:  · Mild global hypokinesis. Computer assisted calculation of left ventricular ejection fraction 48%  ·   Duplex Venous BLE: 04/03/2017  · No DVT noted on bilateral lower extremity venous duplex scan.  · Pulsatile flow noted.  · A possible baker's cyst is noted in the right popliteal fossa measuring 2cm.    SAUD: 04/03/2017    Interpretation Summary   · Right Conclusion: The right SAUD is normal.  · Left  Conclusion: The left SAUD is normal.       Study Impression   Right Conclusion: The right SAUD is normal.     • Right Posterior Tibial: triphasic arterial flow noted.   • Right Dorsalis Pedis: triphasic arterial flow noted.     Left Conclusion: The left SAUD is normal.     • Left Posterior Tibial: triphasic arterial flow noted.   • Left Dorsalis Pedis: triphasic arterial flow noted.       Lower Arterial Doppler Pressures    Right Pressure Left Pressure   Brachial 98 mmHg       95 mmHg         High Thigh           Low Thigh           Popliteal            mmHg       110 mmHg          mmHg       112 mmHg         Peroneal           Great Toe           2nd Digit           3rd Digit           4th Digit           5th Digit              Lower Arterial Doppler Ratios    Right Ratio Left Ratio   Calf           SAUD 1.14        1.14          TBI           Post Ex SAUD                Labs:   Lab Results   Component Value Date    GLUCOSE 95 04/18/2017    CALCIUM 9.7 04/18/2017     (L) 04/18/2017    K 4.1 04/18/2017    CO2 27.0 04/18/2017    CL 93 (L) 04/18/2017    BUN 37 (H) 04/18/2017    CREATININE 1.40 (H) 04/18/2017    EGFRIFNONA 37 (L) 04/18/2017    BCR 26.4 (H) 04/18/2017    ANIONGAP 13.0 04/18/2017     Lab Results   Component Value Date    WBC 4.55 03/24/2017    HGB 12.3 03/24/2017    HCT 35.5 03/24/2017    MCV 88.3 03/24/2017     03/24/2017     No results found for: CHOL  Lab Results   Component Value Date    TRIG 144 01/29/2014     Lab Results   Component Value Date    HDL 29 (L) 01/29/2014     Lab Results   Component Value Date    LDLCALC 118 01/29/2014     No results found for: LDL  No results found for: HDLLDLRATIO  No components found for: CHOLHDL  Lab Results   Component Value Date    TSH 8.740 (H) 03/24/2017     The following portions of the patient's history were reviewed and updated as appropriate: allergies, current medications, past family history, past medical history, past social history,  past surgical history and problem list.     Assessment:      Diagnosis Plan   1. Chronic systolic heart failure  Basic Metabolic Panel   2. Pulmonary hypertension     3. Drug therapy  Basic Metabolic Panel   4. Nonrheumatic aortic valve insufficiency     5. Non-rheumatic mitral regurgitation       Ms Landis presents with new diagnosis of HFrEF for which there is mild hypervolemia. She is reasonably perfused.     Plan:   Due to hemodynamic findings, we are unable to move forward with more aggressive GMT, such as BB, ACE/ARB/ENTRESTO.  We will continue with current dose of Bumex, along with AA.     Will contact with today's lab results.       Recommended daily weight monitoring.  Recommended restricted dietary sodium intake not to exceed 2000 mg daily along with FR not to exceed 2000 ml daily;  Discussed patient action plan for heart failure: contact HF Clinic for 2-3# weight gain overnight or 5# in one week - along with taking additional 1 mg morning Bumex - total 2 mg;  Recommended avoiding NSAIDs use;  Discussed warning signs requiring additional medical attention for heart failure.    Additional HF device therapy/advanced care appropriateness will be addressed as we move forward.     TTE with PAH protocol in 3 months (July) - as per Dr Meadows direction.

## 2017-04-21 PROBLEM — J96.01 ACUTE RESPIRATORY FAILURE WITH HYPOXIA (HCC): Status: ACTIVE | Noted: 2017-01-01

## 2017-04-21 PROBLEM — E87.5 HYPERKALEMIA: Status: ACTIVE | Noted: 2017-01-01

## 2017-04-21 PROBLEM — R74.01 TRANSAMINITIS: Status: ACTIVE | Noted: 2017-01-01

## 2017-04-21 PROBLEM — N17.9 ACUTE KIDNEY INJURY (HCC): Status: ACTIVE | Noted: 2017-01-01

## 2017-04-21 PROBLEM — A41.9 SEPSIS (HCC): Status: ACTIVE | Noted: 2017-01-01

## 2017-04-21 PROBLEM — R79.89 POSITIVE D DIMER: Status: ACTIVE | Noted: 2017-01-01

## 2017-04-21 PROBLEM — N30.01 ACUTE CYSTITIS WITH HEMATURIA: Status: ACTIVE | Noted: 2017-01-01

## 2017-04-21 PROBLEM — R00.1 BRADYCARDIA: Status: ACTIVE | Noted: 2017-01-01

## 2017-04-21 PROBLEM — R11.2 NON-INTRACTABLE VOMITING WITH NAUSEA: Status: ACTIVE | Noted: 2017-01-01

## 2017-04-21 PROBLEM — E87.1 HYPONATREMIA: Status: ACTIVE | Noted: 2017-01-01

## 2017-04-21 PROBLEM — R53.1 WEAKNESS GENERALIZED: Status: ACTIVE | Noted: 2017-01-01

## 2017-04-21 PROBLEM — I47.21 TORSADES DE POINTES (HCC): Status: ACTIVE | Noted: 2017-01-01

## 2017-04-21 NOTE — PROGRESS NOTES
Subjective:     Congestive Heart Failure (Generalized not feeling well)    History of Present Illness  Ms Landis presents to the registration desk today, accompanied by her daughter, with indication of extreme weakness following approximately 36 hours of intermittent vomiting - (1) episode of vomiting 2 nights in a row.   She was able to take in small amount of nutrition yesterday.   She denies chest pain, abdominal pain. The last urination was last evening. She has been unable to urinate today.     Review of Systems   Constitution: Positive for weakness and malaise/fatigue. Negative for chills, fever, night sweats and weight gain.   HENT: Negative for congestion.    Cardiovascular: Positive for leg swelling (unchanged from assessment 3 days prior). Negative for chest pain, near-syncope, orthopnea, palpitations, paroxysmal nocturnal dyspnea and syncope. Dyspnea on exertion: NYHA Class III - unchanged.   Respiratory: Negative for cough.    Hematologic/Lymphatic: Negative for bleeding problem.   Gastrointestinal: Positive for nausea and vomiting. Negative for bloating, abdominal pain and diarrhea.   Genitourinary: Negative for dysuria, frequency and hesitancy.     Past Medical History:   Diagnosis Date   • Abnormal mammogram, unspecified    • Benign hypertension    • CHF (congestive heart failure)    • Fibroadenoma of breast    • Fibrocystic disease of breast     History of, bilateral, extensive     • History of bone density study 03/07/2014    Osteoporosis   • History of diagnostic mammography 05/07/2014    [place] [address] [facility] (Abnormal mammogram, unspecified) , Reason: 6WK DX LEFT MAMMOGRAM AND ULTRASOUND   • History of echocardiogram 01/30/2014    Echocardiogram W/ color flow 23285 (Normal LV systolic function with EF of approx. 60%. Early diastolic dysfunction. Mild aortic and tricuspid regurg. Normal RV size and function. No intracardiac mass, pericardial effusion or cardiac thrombus  seen.)   • History of mammogram 02/07/2014    BI-RADS Category 0   • Hypercalcemia    • Hyperparathyroidism     parathyroidectomy 3-16-15      • Hyperproteinemia    • Hyperthyroidism    • Mammogram abnormal     LEFT side with 2 areas of microcalcification    • Osteoporosis    • Postoperative hypothyroidism    • Sensorineural hearing loss, bilateral    • Thyroid nodule     left cold- benign- left thyroidectomy    • Toxic multinodular goiter     right hot----right thyroidectomy   • Vitamin D deficiency      Past Surgical History:   Procedure Laterality Date   • BREAST BIOPSY  03/21/2014    Biopsy of the left breast in 2 areas using stereotactic guidance and a rotating biopsy device with clip placement.   • COLONOSCOPY  03/06/2014    normal   • INJECTION OF MEDICATION  01/10/2014    Kenalog (1)      • LIPOMA EXCISION  03/27/1997    Biopsy, abdominal mass (Mass, abdominal wall, probably a large lipoma. Excision of lipoma.)   • MAMMO DIAGNOSTIC UNILATERAL  02/26/2014    DIAG MAMM, UNILATERAL DIGITAL  (Medicare) (Abnormal mammogram, unspecified) , Reason: LT BREAST   • THYROIDECTOMY  03/16/2015    Thyroid nodules and hyperparathyroidism. Total thyroidectomy and parathyroidectomy.     Social History     Social History   • Marital status:      Spouse name: N/A   • Number of children: N/A   • Years of education: N/A     Social History Main Topics   • Smoking status: Never Smoker   • Smokeless tobacco: Never Used   • Alcohol use No   • Drug use: No   • Sexual activity: Not Asked     Other Topics Concern   • None     Social History Narrative     Current Outpatient Prescriptions   Medication Sig Dispense Refill   • albuterol (ACCUNEB) 1.25 MG/3ML nebulizer solution Take 3 mL by nebulization Every 6 (Six) Hours As Needed for wheezing. 60 vial 12   • albuterol (VENTOLIN HFA) 108 (90 BASE) MCG/ACT inhaler Inhale 2 puffs Every 4 (Four) Hours As Needed for wheezing. 8 g 1   • Ascorbic Acid (VITAMIN C PO) Take 1 tablet by  "mouth Daily. Vitamin C With Sparkle Hips oral     • bumetanide (BUMEX) 2 MG tablet Take 0.5 tablets by mouth Daily. 31 tablet 0   • Cholecalciferol (VITAMIN D3 PO) Take 1 tablet by mouth Daily.     • Cyanocobalamin (VITAMIN B-12 PO) Take 1 tablet by mouth Daily.     • levothyroxine (SYNTHROID) 125 MCG tablet Take 1 tablet by mouth Daily. 30 tablet 11   • Magnesium Oxide 200 MG tablet Take 1 tablet by mouth 2 (Two) Times a Day. 60 tablet 0   • spironolactone (ALDACTONE) 25 MG tablet 1/2 tablet by mouth daily @ noon 15 tablet 2     No current facility-administered medications for this visit.      Objective:     Vitals:    04/21/17 0950   BP: (!) 78/40   Pulse: 60   Resp: 18   SpO2: 99%   Weight: 117 lb 4 oz (53.2 kg)   Height: 60\" (152.4 cm)      Physical Exam   Constitutional: She is oriented to person, place, and time. She appears well-nourished. No distress.   HENT:   Head: Normocephalic and atraumatic.   Neck: JVD: 10 cm @ 45 degrees.   Cardiovascular: Regular rhythm.  Bradycardia present.  Exam reveals no gallop.    No murmur heard.  Pulses:       Radial pulses are 1+ on the right side, and 1+ on the left side.   Pulmonary/Chest: Effort normal and breath sounds normal. No respiratory distress. She has no wheezes.   Abdominal: Soft. Bowel sounds are normal. She exhibits no distension. There is no tenderness. There is no rebound and no guarding.   Musculoskeletal: Edema: unchanged from previous exam.   Neurological: She is alert and oriented to person, place, and time.   Skin: Skin is warm and dry. She is not diaphoretic.   Psychiatric: She has a normal mood and affect. Her behavior is normal. Judgment and thought content normal.   Vitals reviewed.    Cardiographics  Echocardiogram: 03/17/2017  · Left Ventricle: Calculated EF = 33%. Estimated EF was in agreement with the calculated EF.  · Global left ventricular wall motion appears abnormal  · Left ventricular wall thickness is consistent with severe concentric " hypertrophy  · Left ventricular diastolic dysfunction is noted (grade II w/high LAP) consistent with pseudonormalization. Elevated left atrial pressure.  · Right Ventricle: Normal cavity size and septal motion noted. Mildly reduced systolic function.  · The inferior vena cava is dilated. IVC inspiratory collapse is absent.  · Moderate aortic valve regurgitation is present.  · Moderate mitral valve regurgitation is present.  · Severe tricuspid valve regurgitation is present.  · Moderate to severe pulmonary hypertension is present.  · There is a small (<1cm) circumferential pericardial effusion  · There is a moderate size left pleural effusion.     MPS: 04/07/2017:  · Nuclear Study Description: A 1-day rest/stress protocol myocardial perfusion imaging study was performed.  · Nuclear Perfusion Images: Overall image quality is excellent  · Stress Description: A pharmacological stress test was performed using regadenoson without low-level exercise.  · Stress Findings: No ECG evidence of myocardial ischemia  · Study Impression: Myocardial perfusion imaging indicates a normal myocardial perfusion study with no evidence of ischemia.  · Impressions are consistent with a low risk study.     MUGA: 04/14/2017:  · Mild global hypokinesis. Computer assisted calculation of left ventricular ejection fraction 48%  ·    Duplex Venous BLE: 04/03/2017  · No DVT noted on bilateral lower extremity venous duplex scan.  · Pulsatile flow noted.  · A possible baker's cyst is noted in the right popliteal fossa measuring 2cm.     SAUD: 04/03/2017    Interpretation Summary   · Right Conclusion: The right SAUD is normal.  · Left Conclusion: The left SAUD is normal.       Study Impression   Right Conclusion: The right SAUD is normal.     • Right Posterior Tibial: triphasic arterial flow noted.   • Right Dorsalis Pedis: triphasic arterial flow noted.     Left Conclusion: The left SAUD is normal.     • Left Posterior Tibial: triphasic arterial flow  noted.   • Left Dorsalis Pedis: triphasic arterial flow noted.      Lower Arterial Doppler Pressures     Right Pressure Left Pressure   Brachial 98 mmHg        95 mmHg          High Thigh               Low Thigh               Popliteal                mmHg        110 mmHg           mmHg        112 mmHg          Peroneal               Great Toe               2nd Digit               3rd Digit               4th Digit               5th Digit                  Lower Arterial Doppler Ratios     Right Ratio Left Ratio   Calf               SAUD 1.14         1.14           TBI               Post Ex SAUD                    Labs:   Lab Results   Component Value Date    GLUCOSE 95 04/18/2017    CALCIUM 9.7 04/18/2017     (L) 04/18/2017    K 4.1 04/18/2017    CO2 27.0 04/18/2017    CL 93 (L) 04/18/2017    BUN 37 (H) 04/18/2017    CREATININE 1.40 (H) 04/18/2017    EGFRIFNONA 37 (L) 04/18/2017    BCR 26.4 (H) 04/18/2017    ANIONGAP 13.0 04/18/2017     Lab Results   Component Value Date    WBC 4.55 03/24/2017    HGB 12.3 03/24/2017    HCT 35.5 03/24/2017    MCV 88.3 03/24/2017     03/24/2017     No results found for: CHOL  Lab Results   Component Value Date    TRIG 144 01/29/2014     Lab Results   Component Value Date    HDL 29 (L) 01/29/2014     Lab Results   Component Value Date    LDLCALC 118 01/29/2014     No results found for: LDL  No results found for: HDLLDLRATIO  No components found for: CHOLHDL  Lab Results   Component Value Date    TSH 8.740 (H) 03/24/2017     The following portions of the patient's history were reviewed and updated as appropriate: allergies, current medications, past family history, past medical history, past social history, past surgical history and problem list.     Assessment:      Diagnosis Plan   1. Non-intractable vomiting with nausea, unspecified vomiting type  ECG 12 Lead    CBC & Differential    Comprehensive Metabolic Panel    Magnesium    Amylase    Lipase    Urinalysis  With / Culture If Indicated    CBC Auto Differential   2. Weakness generalized  ECG 12 Lead    CBC & Differential    Comprehensive Metabolic Panel    Magnesium    Amylase    Lipase    Urinalysis With / Culture If Indicated    CBC Auto Differential   3. Acute systolic heart failure  ECG 12 Lead    CBC & Differential    Comprehensive Metabolic Panel    Magnesium    Amylase    Lipase    Urinalysis With / Culture If Indicated    CBC Auto Differential   4. Pulmonary hypertension  ECG 12 Lead    CBC & Differential    Comprehensive Metabolic Panel    Magnesium    Amylase    Lipase    Urinalysis With / Culture If Indicated    CBC Auto Differential   5. Nonrheumatic aortic valve insufficiency  ECG 12 Lead    CBC & Differential    Comprehensive Metabolic Panel    Magnesium    Amylase    Lipase    Urinalysis With / Culture If Indicated    CBC Auto Differential   6. Non-rheumatic mitral regurgitation  ECG 12 Lead    CBC & Differential    Comprehensive Metabolic Panel    Magnesium    Amylase    Lipase    Urinalysis With / Culture If Indicated    CBC Auto Differential        Plan:    Following triage and initial evaluation: # 22 gauge IV initiated to the (R) forearm with Normal Saline with 250 cc bolus given.   Following the bolus: 10:50AM SBP doppler 74 mmHg (LA).  Awaiting lab results.     Lab results completed with findings of BARRY.    Dr Meadows has made contact with Dr Soliman in the ED. Ms Landis is transported via wheelchair to the ED for further evaluation/managment.

## 2017-04-23 PROBLEM — E87.5 HYPERKALEMIA: Status: RESOLVED | Noted: 2017-01-01 | Resolved: 2017-01-01

## 2017-04-24 PROBLEM — I42.8 NICM (NONISCHEMIC CARDIOMYOPATHY) (HCC): Chronic | Status: ACTIVE | Noted: 2017-01-01

## 2017-04-26 LAB
ALBUMIN 24H MFR UR ELPH: 42.5 %
ALPHA1 GLOB 24H MFR UR ELPH: 2 %
ALPHA2 GLOB 24H MFR UR ELPH: 4.6 %
B-GLOBULIN MFR UR ELPH: 8.3 %
BACTERIA FLD CULT: NORMAL
BACTERIA SPEC AEROBE CULT: NORMAL
GAMMA GLOB 24H MFR UR ELPH: 42.7 %
Lab: NORMAL
M PROTEIN MFR UR ELPH: NORMAL %
PROT 24H UR-MRATE: 62 MG/24 HR (ref 30–150)
PROT UR-MCNC: 248.1 MG/DL

## 2017-04-27 PROBLEM — I50.9 CHF (CONGESTIVE HEART FAILURE) (HCC): Status: ACTIVE | Noted: 2017-04-27

## 2017-04-27 PROBLEM — R65.21 SEPTIC SHOCK (HCC): Status: ACTIVE | Noted: 2017-01-01

## 2017-06-23 LAB
CYTO UR: NORMAL
LAB AP CASE REPORT: NORMAL
LAB AP CLINICAL INFORMATION: NORMAL
LAB AP DIAGNOSIS COMMENT: NORMAL
LAB AP PROVISIONAL DIAGNOSIS: NORMAL
Lab: NORMAL
PATH REPORT.FINAL DX SPEC: NORMAL
PATH REPORT.GROSS SPEC: NORMAL

## 2017-12-01 NOTE — PROGRESS NOTES
Subjective    Lissette Landis is a 76 y.o. female. she is here today for follow-up.    History of Present Illness         76 year old female presents for follow up      reason - total thyroidectomy and right parathyroidectomy     duration - March 2016      Timing constant      Quality not controlled      Severity moderate      alleviating factors compliance with levothyroxine      Symptoms - fatigue, swelling         Previously seen for hyperthyroidism, hypercalcemia                                   Highest calcium was 11.3      Severity high due to osteoporosis      Now controlled           Evaluation history:  TSH   Date Value Ref Range Status   02/08/2017 2.270 0.460 - 4.680 mIU/mL Final   12/23/2016 4.89 (H) 0.46 - 4.68 uIU/ml Final     FREE T4   Date Value Ref Range Status   11/29/2016 1.41 0.78 - 2.19 ng/dl Final       Current medications:  Current Outpatient Prescriptions   Medication Sig Dispense Refill   • Ascorbic Acid (VITAMIN C PO) Take 1 tablet by mouth Daily. Vitamin C With Sparkle Hips oral     • Cholecalciferol (VITAMIN D3 PO) Take 1 tablet by mouth Daily.     • Cyanocobalamin (VITAMIN B-12 PO) Take 1 tablet by mouth Daily.     • furosemide (LASIX) 20 MG tablet Take 0.5 tablets by mouth Daily As Needed (FOR LEG SWELLING). 15 tablet 3   • levothyroxine (SYNTHROID) 112 MCG tablet Take 1 tablet by mouth Daily. 30 tablet 3   • ondansetron (ZOFRAN) 4 MG tablet Take 4 mg by mouth Every 4 (Four) Hours As Needed for nausea or vomiting.     • PARoxetine (PAXIL) 10 MG tablet Take 1 tablet by mouth Daily. 30 tablet 2   • cefuroxime (CEFTIN) 250 MG tablet Take 1 tablet by mouth 2 (Two) Times a Day for 10 days. 20 tablet 0   • PARoxetine (PAXIL) 20 MG tablet Take 1 tablet by mouth Daily. 30 tablet 2     No current facility-administered medications for this visit.        The following portions of the patient's history were reviewed and updated as appropriate:   Past Medical History   Diagnosis Date   • Abnormal  Chief Complaint   Patient presents with   • Menopause     discuss menopause issues   • Weight Gain   • Joint Pain   • Insomnia   • Other     patient feeling crabby       HISTORY OF PRESENT ILLNESS:  Patient is a 51 year old  alert female who presents today for office visit. Since May 2017, she's been feeling irritable, anxious, moses, and short with her . She states that she's been \"crabby.\" Has crying spells and admits to feeling occasionally depressed at times. No suicidal feelings or thoughts of harm. She been really stressed out with work. Has gained weight >20 lbs since May. She has not been eating healthy and she eats whenever she's bored or stressed. She's been so busy with work and only minimal exercise. Denies vaginal dryness or hot flashes. Admits to knee joint pain as the only menopausal symptom. TSH was drawn this morning.      CONTRACEPTION: None    ALLERGIES:   Allergen Reactions   • Contrast Media HIVES   • Morphine VOMITING     No outpatient prescriptions have been marked as taking for the 17 encounter (Office Visit) with Cheyanne Callejas CNM.     Past Medical History:   Diagnosis Date   • DDD (degenerative disc disease), thoracic 08   • Emphysematous bleb of lung (CMS/HCC) congenital   • Headache(784.0) 07   • Hematuria, microscopic    • Pneumothorax     had surg to minimize blebs, pleurodesis.   • PONV (postoperative nausea and vomiting)    • Toxemia in pregnancy      Past Surgical History:   Procedure Laterality Date   • KNEE ARTHROSCOPY W/ MENISCECTOMY  2013    left knee w removal of cyst; Dr Donald FERRIS   • SHOULDER ARTHROSCOPY W/ ROTATOR CUFF REPAIR  13    Dr Donald FERRIS. Right.   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 cardio, weights,      Social History Narrative    lives with  and son.             Family History   Problem Relation Age of Onset   • Hypertension Mother    • Diabetes Mother    • Obesity Mother    • Cancer Father      Renal CA   • OTHER Brother      MVA   • Alcohol/Drug Brother    • Thyroid Sister      hasimotos     Obstetric History       T0      L0     SAB0   TAB0   Ectopic0   Molar0   Multiple0   Live Births0        GYNECOLOGIC HISTORY:  Patient's last menstrual period was 2017 (within days). Menses are 28-32 , lasting 2-3 days and light in flow.    REVIEW OF SYSTEMS:  Constitutional: Denies headache. No weight changes. No fevers or chills. No fatigue.  Urinary: Denies urgency, frequency, dysuria, or incontinence.  Endocrine: Denies hot flashes, night sweats, heat or cold intolerance.  Psychiatric: Admits anxiety, occasional depression, and sleep changes.. Denies memory deficits.  Stressors: Work,     OBJECTIVES:  Visit Vitals   5' 5\" (1.651 m)   Wt 105.3 kg   LMP 2017 (Within Days)   BMI 38.64 kg/m²       GENERAL APPEARANCE: The patient is a pleasant, normal appearing female with normal affect and in no distress.      ASSESSMENT:    1. Perimenopause    2. Weight gain    3. Mood complaints in sleep disorder    4. Anxiety and depression    5. Screening for colon cancer        PLAN:  1. Draw vitamin D level   2. Neurotransmitter kit sent home with patient  3. Wellbutrin 150 mg daily ordered  4. Encourage Whole 30 to help with her weight gain  5. Cologuard will be sent to patient   6. Patient in to call in 2-3 weeks for  mammogram, unspecified    • Benign hypertension    • Fibroadenoma of breast    • Fibrocystic disease of breast      History of, bilateral, extensive     • History of bone density study 03/07/2014     Osteoporosis   • History of diagnostic mammography 05/07/2014     [place] [address] [facility] (Abnormal mammogram, unspecified) , Reason: 6WK DX LEFT MAMMOGRAM AND ULTRASOUND   • History of echocardiogram 01/30/2014     Echocardiogram W/ color flow 10670 (Normal LV systolic function with EF of approx. 60%. Early diastolic dysfunction. Mild aortic and tricuspid regurg. Normal RV size and function. No intracardiac mass, pericardial effusion or cardiac thrombus seen.)   • History of mammogram 02/07/2014     BI-RADS Category 0   • Hypercalcemia    • Hyperparathyroidism      parathyroidectomy 3-16-15      • Hyperproteinemia    • Hyperthyroidism    • Mammogram abnormal      LEFT side with 2 areas of microcalcification    • Osteoporosis    • Postoperative hypothyroidism    • Sensorineural hearing loss, bilateral    • Thyroid nodule      left cold- benign- left thyroidectomy    • Toxic multinodular goiter      right hot----right thyroidectomy   • Vitamin D deficiency      Past Surgical History   Procedure Laterality Date   • Lipoma excision  03/27/1997     Biopsy, abdominal mass (Mass, abdominal wall, probably a large lipoma. Excision of lipoma.)   • Colonoscopy  03/06/2014     normal   • Mammo diagnostic unilateral  02/26/2014     DIAG MAMM, UNILATERAL DIGITAL  (Medicare) (Abnormal mammogram, unspecified) , Reason: LT BREAST   • Injection of medication  01/10/2014     Kenalog (1)      • Breast biopsy  03/21/2014     Biopsy of the left breast in 2 areas using stereotactic guidance and a rotating biopsy device with clip placement.   • Thyroidectomy  03/16/2015     Thyroid nodules and hyperparathyroidism. Total thyroidectomy and parathyroidectomy.     Family History   Problem Relation Age of Onset   •  Lupus Father      systemic   • Cancer Other      OB History     No data available        Allergies   Allergen Reactions   • Sulfa Antibiotics GI Intolerance     Social History     Social History   • Marital status:      Spouse name: N/A   • Number of children: N/A   • Years of education: N/A     Social History Main Topics   • Smoking status: Never Smoker   • Smokeless tobacco: None   • Alcohol use No   • Drug use: None   • Sexual activity: Not Asked     Other Topics Concern   • None     Social History Narrative       Review of Systems  Review of Systems   Constitutional: Negative for activity change, appetite change, chills, diaphoresis and fatigue.   HENT: Negative for congestion, dental problem, drooling, ear discharge, ear pain, facial swelling, sneezing, sore throat, tinnitus, trouble swallowing and voice change.    Eyes: Negative for photophobia, pain, discharge, redness, itching and visual disturbance.   Respiratory: Negative for apnea, cough, choking, chest tightness and shortness of breath.    Cardiovascular: Negative for chest pain, palpitations and leg swelling.   Gastrointestinal: Negative for abdominal distention, abdominal pain, constipation, diarrhea, nausea and vomiting.   Endocrine: Negative for cold intolerance, heat intolerance, polydipsia, polyphagia and polyuria.   Genitourinary: Negative for difficulty urinating, dysuria, frequency, hematuria and urgency.   Musculoskeletal: Negative for arthralgias, back pain, gait problem, joint swelling, myalgias, neck pain and neck stiffness.   Skin: Negative for color change, pallor, rash and wound.   Allergic/Immunologic: Negative for environmental allergies, food allergies and immunocompromised state.   Neurological: Negative for dizziness, tremors, facial asymmetry, weakness, light-headedness, numbness and headaches.   Hematological: Negative for adenopathy. Does not bruise/bleed easily.   Psychiatric/Behavioral: Negative for agitation, behavioral  evaluation  7. Return to clinic in one year, or prn   8. 10/15 minutes counseling regarding treatment options.    Linn DAVIDSON, scribe for Susie Callejas CNM    The documentation recorded by the scribtiffanie accurately and completely reflects the service(s) I personally performed and the decisions made by me, Cheyanne Callejas CNM    "problems, confusion, decreased concentration and sleep disturbance.        Objective      Visit Vitals   • BP 96/58 (BP Location: Right arm, Patient Position: Sitting, Cuff Size: Adult)   • Pulse 93   • Ht 60\" (152.4 cm)   • Wt 130 lb 14.4 oz (59.4 kg)   • BMI 25.56 kg/m2     Physical Exam   Constitutional: She is oriented to person, place, and time. She appears well-developed and well-nourished. No distress.   HENT:   Head: Normocephalic and atraumatic.   Right Ear: External ear normal.   Left Ear: External ear normal.   Nose: Nose normal.   Eyes: Conjunctivae and EOM are normal. Pupils are equal, round, and reactive to light.   Neck: Normal range of motion. Neck supple. No tracheal deviation present. No thyromegaly present.   Cardiovascular: Normal rate, regular rhythm and normal heart sounds.    No murmur heard.  Pulmonary/Chest: Effort normal. No respiratory distress. She has wheezes.   Abdominal: Soft. Bowel sounds are normal. There is no tenderness. There is no rebound and no guarding.   Musculoskeletal: Normal range of motion. She exhibits no edema, tenderness or deformity.   Neurological: She is alert and oriented to person, place, and time. No cranial nerve deficit.   Skin: Skin is warm and dry. No rash noted.   Psychiatric: She has a normal mood and affect. Her behavior is normal. Judgment and thought content normal.       Lab Review  Lab Results   Component Value Date    TSH 2.270 02/08/2017    TSH 4.89 (H) 12/23/2016     Lab Results   Component Value Date    FREET4 1.41 11/29/2016        Assessment/Plan      1. Cough    2. Postoperative hypothyroidism    3. Benign hypertension    4. Osteoporosis    . This diagnosis was discussed and reviewed with the patient including the advantages of drug therapy.     1. Orders placed during this encounter include:  Orders Placed This Encounter   Procedures   • XR Chest 2 View     Coughing, swelling     Order Specific Question:   Reason for Exam:     Answer:   cough, " wheezing   • Comprehensive Metabolic Panel   • TSH   • Vitamin D 25 Hydroxy       Medications prescribed:  Outpatient Encounter Prescriptions as of 2/15/2017   Medication Sig Dispense Refill   • Ascorbic Acid (VITAMIN C PO) Take 1 tablet by mouth Daily. Vitamin C With Sparkle Hips oral     • Cholecalciferol (VITAMIN D3 PO) Take 1 tablet by mouth Daily.     • Cyanocobalamin (VITAMIN B-12 PO) Take 1 tablet by mouth Daily.     • furosemide (LASIX) 20 MG tablet Take 0.5 tablets by mouth Daily As Needed (FOR LEG SWELLING). 15 tablet 3   • levothyroxine (SYNTHROID) 112 MCG tablet Take 1 tablet by mouth Daily. 30 tablet 3   • ondansetron (ZOFRAN) 4 MG tablet Take 4 mg by mouth Every 4 (Four) Hours As Needed for nausea or vomiting.     • PARoxetine (PAXIL) 10 MG tablet Take 1 tablet by mouth Daily. 30 tablet 2   • cefuroxime (CEFTIN) 250 MG tablet Take 1 tablet by mouth 2 (Two) Times a Day for 10 days. 20 tablet 0   • PARoxetine (PAXIL) 20 MG tablet Take 1 tablet by mouth Daily. 30 tablet 2     No facility-administered encounter medications on file as of 2/15/2017.          Plan Details     Hypercalcemia - resolved after parathyroidectomy      June 2016      Calcium normal         Postoperative hypothyroidism ---      Presently doing 112 6.5 tabs per week  Increase to one tab daily       Feb. 2017    TSH - nl    Keep 112 mcg once daily        Edema -- elevate legs, discussed some swelling may be related to the hypothyroidism  Also low albumin,  She is depressed, her  passed away this year 2016, barely eating  Improve albumin, take daily ensure--not taking -- please take and I recommend two daily      For depression , I started paxil 10 mg daily but asked for her to establish w Dr. Jim--increase to 20 mg         Prediabetes      Nov 2016     HgbA1c 5.8 %     lifestyle changes discussed, exercise       Cough---pneumonia    Send for chest xray    Take mucinex otc    Called in ceftin 250 mg BID --changed to levaquin 500  mg QD for 10 days    proventil inhaler 2 puffs every 4 to 6 hours    Hydration     Appointment with Family Practice Dr. Guajardo in two days for follow up     Xray results as below      PROCEDURE: Two-view chest     COMPARISON: 3/25/2014     HISTORY: cough, wheezing, R05 Cough     FINDINGS:   Frontal and lateral views of the chest are obtained.      Lungs: Blunting of the costophrenic angles likely due to small to moderate bilateral pleural effusions. Bibasilar airspace opacifications could be due to pneumonia or atelectasis. Coarse interstitial markings with superimposed pulmonary vascular   congestion. No pneumothorax.     Cardiomediastinal structures: Cardiac silhouette is normal in size. Thoracic aorta appears mildly tortuous and contains atherosclerotic calcification.     IMPRESSION:  CONCLUSION:  Blunting of the costophrenic angles likely due to small to moderate bilateral pleural effusions. Bibasilar airspace opacifications could be due to pneumonia or atelectasis. Coarse interstitial markings with superimposed pulmonary vascular congestion.     Electronically signed by: Edward Lieberman MD 2/15/2017 3:34 PM CST                      4. Return in about 8 weeks (around 4/12/2017) for Recheck.
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
## (undated) DEVICE — VSI MICRO-INTRODUCER KITS ARE INTENDED FOR USE IN PERCUTANEOUS INTRODUCTION OF UP TO A 0.018 INCH OR 0.038 INCH GUIDEWIRE OR CATHETER INTO THE VASCULAR SYSTEM FOLLOWING A SMALL GAUGE NEEDLE STICK.: Brand: VSI MICRO-INTRODUCER KIT

## (undated) DEVICE — INTRO SHEATH ULTIMUM ACT 5F

## (undated) DEVICE — CATH PACE PACEL BIOPOLAR HEART CRV 5F 110CM RT

## (undated) DEVICE — PK CATH LAB 60

## (undated) DEVICE — ELECTRODE,RT,STRESS,FOAM,50PK: Brand: MEDLINE

## (undated) DEVICE — SLV REPOSTNG CATH STRL 60CM

## (undated) DEVICE — DISPOSABLE ADAPTER